# Patient Record
Sex: FEMALE | Race: WHITE | NOT HISPANIC OR LATINO | Employment: FULL TIME | URBAN - METROPOLITAN AREA
[De-identification: names, ages, dates, MRNs, and addresses within clinical notes are randomized per-mention and may not be internally consistent; named-entity substitution may affect disease eponyms.]

---

## 2021-07-05 ENCOUNTER — IMMUNIZATION (OUTPATIENT)
Dept: NURSING | Facility: CLINIC | Age: 36
End: 2021-07-05
Payer: COMMERCIAL

## 2021-07-05 PROCEDURE — 0001A PR COVID VAC PFIZER DIL RECON 30 MCG/0.3 ML IM: CPT

## 2021-07-05 PROCEDURE — 91300 PR COVID VAC PFIZER DIL RECON 30 MCG/0.3 ML IM: CPT

## 2021-07-26 ENCOUNTER — IMMUNIZATION (OUTPATIENT)
Dept: NURSING | Facility: CLINIC | Age: 36
End: 2021-07-26
Attending: INTERNAL MEDICINE
Payer: COMMERCIAL

## 2021-07-26 PROCEDURE — 91300 PR COVID VAC PFIZER DIL RECON 30 MCG/0.3 ML IM: CPT

## 2021-07-26 PROCEDURE — 0002A PR COVID VAC PFIZER DIL RECON 30 MCG/0.3 ML IM: CPT

## 2021-11-09 ENCOUNTER — OFFICE VISIT (OUTPATIENT)
Dept: INTERNAL MEDICINE | Facility: CLINIC | Age: 36
End: 2021-11-09
Payer: COMMERCIAL

## 2021-11-09 VITALS
HEART RATE: 90 BPM | OXYGEN SATURATION: 97 % | HEIGHT: 67 IN | SYSTOLIC BLOOD PRESSURE: 110 MMHG | BODY MASS INDEX: 40.34 KG/M2 | TEMPERATURE: 99 F | DIASTOLIC BLOOD PRESSURE: 76 MMHG | WEIGHT: 257 LBS

## 2021-11-09 DIAGNOSIS — B07.8 COMMON WART: ICD-10-CM

## 2021-11-09 DIAGNOSIS — E66.01 MORBID OBESITY (H): ICD-10-CM

## 2021-11-09 DIAGNOSIS — Z00.00 ROUTINE GENERAL MEDICAL EXAMINATION AT A HEALTH CARE FACILITY: Primary | ICD-10-CM

## 2021-11-09 DIAGNOSIS — Z13.220 ENCOUNTER FOR SCREENING FOR LIPID DISORDER: ICD-10-CM

## 2021-11-09 DIAGNOSIS — Z23 ENCOUNTER FOR IMMUNIZATION: ICD-10-CM

## 2021-11-09 DIAGNOSIS — G43.109 MIGRAINE WITH AURA AND WITHOUT STATUS MIGRAINOSUS, NOT INTRACTABLE: ICD-10-CM

## 2021-11-09 DIAGNOSIS — Z13.29 SCREENING FOR THYROID DISORDER: ICD-10-CM

## 2021-11-09 DIAGNOSIS — Z13.1 ENCOUNTER FOR SCREENING FOR DIABETES MELLITUS: ICD-10-CM

## 2021-11-09 LAB
ALBUMIN SERPL-MCNC: 3.6 G/DL (ref 3.4–5)
ALP SERPL-CCNC: 67 U/L (ref 40–150)
ALT SERPL W P-5'-P-CCNC: 26 U/L (ref 0–50)
ANION GAP SERPL CALCULATED.3IONS-SCNC: 9 MMOL/L (ref 3–14)
AST SERPL W P-5'-P-CCNC: 17 U/L (ref 0–45)
BILIRUB SERPL-MCNC: 0.2 MG/DL (ref 0.2–1.3)
BUN SERPL-MCNC: 18 MG/DL (ref 7–30)
CALCIUM SERPL-MCNC: 8.9 MG/DL (ref 8.5–10.1)
CHLORIDE BLD-SCNC: 105 MMOL/L (ref 94–109)
CHOLEST SERPL-MCNC: 202 MG/DL
CO2 SERPL-SCNC: 21 MMOL/L (ref 20–32)
CREAT SERPL-MCNC: 0.92 MG/DL (ref 0.52–1.04)
FASTING STATUS PATIENT QL REPORTED: NO
GFR SERPL CREATININE-BSD FRML MDRD: 81 ML/MIN/1.73M2
GLUCOSE BLD-MCNC: 104 MG/DL (ref 70–99)
HDLC SERPL-MCNC: 56 MG/DL
LDLC SERPL CALC-MCNC: 116 MG/DL
NONHDLC SERPL-MCNC: 146 MG/DL
POTASSIUM BLD-SCNC: 4 MMOL/L (ref 3.4–5.3)
PROT SERPL-MCNC: 7 G/DL (ref 6.8–8.8)
SODIUM SERPL-SCNC: 135 MMOL/L (ref 133–144)
TRIGL SERPL-MCNC: 152 MG/DL
TSH SERPL DL<=0.005 MIU/L-ACNC: 1.94 MU/L (ref 0.4–4)

## 2021-11-09 PROCEDURE — 80053 COMPREHEN METABOLIC PANEL: CPT | Performed by: INTERNAL MEDICINE

## 2021-11-09 PROCEDURE — 90715 TDAP VACCINE 7 YRS/> IM: CPT | Performed by: INTERNAL MEDICINE

## 2021-11-09 PROCEDURE — 90471 IMMUNIZATION ADMIN: CPT | Performed by: INTERNAL MEDICINE

## 2021-11-09 PROCEDURE — 99213 OFFICE O/P EST LOW 20 MIN: CPT | Mod: 25 | Performed by: INTERNAL MEDICINE

## 2021-11-09 PROCEDURE — 99385 PREV VISIT NEW AGE 18-39: CPT | Mod: 25 | Performed by: INTERNAL MEDICINE

## 2021-11-09 PROCEDURE — 36415 COLL VENOUS BLD VENIPUNCTURE: CPT | Performed by: INTERNAL MEDICINE

## 2021-11-09 PROCEDURE — 84443 ASSAY THYROID STIM HORMONE: CPT | Performed by: INTERNAL MEDICINE

## 2021-11-09 PROCEDURE — 80061 LIPID PANEL: CPT | Performed by: INTERNAL MEDICINE

## 2021-11-09 RX ORDER — SUMATRIPTAN 50 MG/1
50 TABLET, FILM COATED ORAL
Qty: 30 TABLET | Refills: 1 | Status: SHIPPED | OUTPATIENT
Start: 2021-11-09 | End: 2023-02-28

## 2021-11-09 SDOH — ECONOMIC STABILITY: INCOME INSECURITY: HOW HARD IS IT FOR YOU TO PAY FOR THE VERY BASICS LIKE FOOD, HOUSING, MEDICAL CARE, AND HEATING?: NOT HARD AT ALL

## 2021-11-09 SDOH — ECONOMIC STABILITY: INCOME INSECURITY: IN THE LAST 12 MONTHS, WAS THERE A TIME WHEN YOU WERE NOT ABLE TO PAY THE MORTGAGE OR RENT ON TIME?: NO

## 2021-11-09 SDOH — HEALTH STABILITY: PHYSICAL HEALTH: ON AVERAGE, HOW MANY DAYS PER WEEK DO YOU ENGAGE IN MODERATE TO STRENUOUS EXERCISE (LIKE A BRISK WALK)?: 3 DAYS

## 2021-11-09 SDOH — ECONOMIC STABILITY: FOOD INSECURITY: WITHIN THE PAST 12 MONTHS, YOU WORRIED THAT YOUR FOOD WOULD RUN OUT BEFORE YOU GOT MONEY TO BUY MORE.: NEVER TRUE

## 2021-11-09 SDOH — ECONOMIC STABILITY: TRANSPORTATION INSECURITY
IN THE PAST 12 MONTHS, HAS LACK OF TRANSPORTATION KEPT YOU FROM MEETINGS, WORK, OR FROM GETTING THINGS NEEDED FOR DAILY LIVING?: NO

## 2021-11-09 SDOH — HEALTH STABILITY: PHYSICAL HEALTH: ON AVERAGE, HOW MANY MINUTES DO YOU ENGAGE IN EXERCISE AT THIS LEVEL?: 30 MIN

## 2021-11-09 SDOH — ECONOMIC STABILITY: TRANSPORTATION INSECURITY
IN THE PAST 12 MONTHS, HAS THE LACK OF TRANSPORTATION KEPT YOU FROM MEDICAL APPOINTMENTS OR FROM GETTING MEDICATIONS?: NO

## 2021-11-09 SDOH — ECONOMIC STABILITY: FOOD INSECURITY: WITHIN THE PAST 12 MONTHS, THE FOOD YOU BOUGHT JUST DIDN'T LAST AND YOU DIDN'T HAVE MONEY TO GET MORE.: NEVER TRUE

## 2021-11-09 ASSESSMENT — ENCOUNTER SYMPTOMS
EYE PAIN: 0
DYSURIA: 0
DIZZINESS: 0
SORE THROAT: 0
NERVOUS/ANXIOUS: 0
JOINT SWELLING: 0
HEMATOCHEZIA: 0
COUGH: 0
DIARRHEA: 1
CHILLS: 0
HEADACHES: 1
BREAST MASS: 0
PALPITATIONS: 0
PARESTHESIAS: 0
NAUSEA: 0
HEMATURIA: 0
WEAKNESS: 0
SHORTNESS OF BREATH: 0
ARTHRALGIAS: 0
CONSTIPATION: 0
MYALGIAS: 0
ABDOMINAL PAIN: 0
FREQUENCY: 1
HEARTBURN: 0
FEVER: 0

## 2021-11-09 ASSESSMENT — SOCIAL DETERMINANTS OF HEALTH (SDOH)
HOW OFTEN DO YOU GET TOGETHER WITH FRIENDS OR RELATIVES?: ONCE A WEEK
HOW OFTEN DO YOU ATTEND CHURCH OR RELIGIOUS SERVICES?: 1 TO 4 TIMES PER YEAR
IN A TYPICAL WEEK, HOW MANY TIMES DO YOU TALK ON THE PHONE WITH FAMILY, FRIENDS, OR NEIGHBORS?: ONCE A WEEK
DO YOU BELONG TO ANY CLUBS OR ORGANIZATIONS SUCH AS CHURCH GROUPS UNIONS, FRATERNAL OR ATHLETIC GROUPS, OR SCHOOL GROUPS?: NO

## 2021-11-09 ASSESSMENT — LIFESTYLE VARIABLES
HOW MANY STANDARD DRINKS CONTAINING ALCOHOL DO YOU HAVE ON A TYPICAL DAY: 1 OR 2
HOW OFTEN DO YOU HAVE A DRINK CONTAINING ALCOHOL: 2-4 TIMES A MONTH
HOW OFTEN DO YOU HAVE SIX OR MORE DRINKS ON ONE OCCASION: NEVER

## 2021-11-09 ASSESSMENT — MIFFLIN-ST. JEOR: SCORE: 1890.43

## 2021-11-09 NOTE — LETTER
November 10, 2021      Cassie Mcfarland  [address]   [facility] [address]       Dear Ms. Cassie Mcfarland:    I am writing to inform you of the results of the laboratory tests you had done recently. Your comprehensive metabolic panel (which looks at your liver, kidneys, electrolytes, and glucose) is normal. Your cholesterol panel is mildly elevated, but not to a level where I would recommend starting a medication to lower it at this point given your age and otherwise relative good health. Your TSH is normal.    Thank you for allowing me to participate in your care. If you have any further questions or problems, please contact me via our nurse line at [phone]. An even easier way to get ahold of myself or my team is through [employer], which you can sign up for at [email or web address]. MyChart is not only a great way to communicate with us, but also allows you to see your full results.      Sincerely,        Kenneth Chaves MD, MPH  Department of Internal Medicine  [facility]

## 2021-11-09 NOTE — LETTER
[facility]  [address] [address]  Phone: [phone]   11/10/2021      Cassie SHAWNA Bruna  [address]   [facility] [address]        Dear Ms. Cassie Mcfarland:    I am writing to inform you of the results of the laboratory tests you had done recently. Your comprehensive metabolic panel (which looks at your liver, kidneys, electrolytes, and glucose) is normal. Your cholesterol panel is mildly elevated, but not to a level where I would recommend starting a medication to lower it at this point given your age and otherwise relative good health. Your TSH is normal.    Thank you for allowing me to participate in your care. If you have any further questions or problems, please contact me via our nurse line at [phone]. An even easier way to get ahold of myself or my team is through [employer], which you can sign up for at [email or web address]. [employer] is not only a great way to communicate with us, but also allows you to see your full results.      Sincerely,        Kenneth Chaves MD, MPH  Department of Internal Medicine  [facility]

## 2021-11-09 NOTE — PROGRESS NOTES
SUBJECTIVE:   CC: Cassie Mcfarland is an 36 year old woman who presents for preventive health visit.     Patient has been advised of split billing requirements and indicates understanding: Yes     Healthy Habits:     Getting at least 3 servings of Calcium per day:  Yes    Bi-annual eye exam:  NO    Dental care twice a year:  Yes    Sleep apnea or symptoms of sleep apnea:  None    Diet:  Regular (no restrictions)    Frequency of exercise:  2-3 days/week    Duration of exercise:  30-45 minutes    Taking medications regularly:  Yes    Medication side effects:  None    PHQ-2 Total Score: 1    Additional concerns today:  Yes    Cassie presents today for a physical exam. This is the first time I have met Cassie. We also discussed her migraines. She gets these infrequently (less than once a month). Does have an aura.  She tells me that her family member gave her a dose of sumatriptan for a prior migraine attack which worked and she is interested in trying this medication for herself.  She would like me to look inside of her ears to see if there is any wax.  She has a wart in between her third and fourth fingers on her right she would like me to look at it.  She tells me she has been working with a dietitian at her gym but has been unable to lose weight.  She presents with a list of roughly 8 thyroid-related lab tests that her dietitian has requested I order.    Today's PHQ-2 Score:   PHQ-2 ( 1999 [employer]) 11/9/2021   Q1: Little interest or pleasure in doing things 0   Q2: Feeling down, depressed or hopeless 1   PHQ-2 Score 1   Q1: Little interest or pleasure in doing things Not at all   Q2: Feeling down, depressed or hopeless Several days   PHQ-2 Score 1     Abuse: Current or Past (Physical, Sexual or Emotional) - No  Do you feel safe in your environment? Yes    Social History     Tobacco Use     Smoking status: Never Smoker     Smokeless tobacco: Never Used   Substance Use Topics     Alcohol use: Yes     Alcohol/week: 2.0  standard drinks     Types: 2 Standard drinks or equivalent per week     If you drink alcohol do you typically have >3 drinks per day or >7 drinks per week? No    Alcohol Use 11/9/2021   Prescreen: >3 drinks/day or >7 drinks/week? No   Prescreen: >3 drinks/day or >7 drinks/week? -     Reviewed orders with patient.  Reviewed health maintenance and updated orders accordingly - Yes    Labs reviewed in EPIC    Breast Cancer Screening:  FHS-7:   Breast CA Risk Assessment (FHS-7) 11/9/2021   Did any of your first-degree relatives have breast or ovarian cancer? Yes   Did any of your relatives have bilateral breast cancer? Unknown   Did any man in your family have breast cancer? No   Did any woman in your family have breast and ovarian cancer? Yes   Did any woman in your family have breast cancer before age 50 y? No   Do you have 2 or more relatives with breast and/or ovarian cancer? Yes   Do you have 2 or more relatives with breast and/or bowel cancer? Yes     Patient under 40 years of age: Routine Mammogram Screening not recommended.   Pertinent mammograms are reviewed under the imaging tab.    History of abnormal Pap smear: NO - age 30-65 PAP every 5 years with negative HPV co-testing recommended     Reviewed and updated as needed this visit by clinical staff  Tobacco  Allergies  Meds  Problems  Med Hx  Surg Hx  Fam Hx  Soc Hx     Reviewed and updated as needed this visit by Provider  Tobacco  Allergies  Meds  Problems  Med Hx  Surg Hx  Fam Hx        Review of Systems   Constitutional: Negative for chills and fever.   HENT: Negative for congestion, ear pain, hearing loss and sore throat.    Eyes: Negative for pain and visual disturbance.   Respiratory: Negative for cough and shortness of breath.    Cardiovascular: Negative for chest pain, palpitations and peripheral edema.   Gastrointestinal: Positive for diarrhea. Negative for abdominal pain, constipation, heartburn, hematochezia and nausea.   Breasts:   "Negative for tenderness, breast mass and discharge.   Genitourinary: Positive for frequency. Negative for dysuria, genital sores, hematuria, pelvic pain, urgency, vaginal bleeding and vaginal discharge.   Musculoskeletal: Negative for arthralgias, joint swelling and myalgias.   Skin: Negative for rash.   Neurological: Positive for headaches. Negative for dizziness, weakness and paresthesias.   Psychiatric/Behavioral: Negative for mood changes. The patient is not nervous/anxious.       OBJECTIVE:   Pulse 90   Temp 99  F (37.2  C) (Tympanic)   Ht 1.689 m (5' 6.5\")   Wt 116.6 kg (257 lb)   LMP  (Exact Date)   SpO2 97%   BMI 40.86 kg/m       Physical Exam  GENERAL: In no distress.  EYES: Conjunctivae/corneas clear. EOMs grossly intact. PERRL.  HENT: NC/AT, facies symmetric. Neck supple. No LAD or thyromegaly noted.  RESP: CTAB. No w/r/r.  CV: RRR, no m/r/g.  GI: NT, ND, without rebound or guarding, no CVA tenderness, no hepatomegaly appreciated.  MSK: Moves all four extremities freely.  SKIN: No significant ulcers, lesions or rashes on the visualized portions of the skin  NEURO: Alert. Oriented.  PSYCH: Linear thought process. Speech normal rate and volume. No tangential thoughts, hallucinations, or delusions.    Diagnostic Test Results: Labs reviewed in [facility]    ASSESSMENT/PLAN:   Routine general medical examination at a health care facility  Reviewed PMH. Discussed healthcare maintenance issues, including cancer screenings (discussed her strong FH of breast cancer, offered genetic counselor referral which she declined today but plans to think about it. Also offered cervical cancer screening today but she declined a Pap smear today), relevant immunizations, and cardiac risk factor screenings such as for cholesterol, HTN, and DM.    Migraine with aura and without status migrainosus, not intractable  Infrequent. Have not responded to NSAIDs. Will trial sumatriptan as that has worked for her in the past as per HPI. " "Discussed appropriate dosing.  - SUMAtriptan (IMITREX) 50 MG tablet; Take 1 tablet (50 mg) by mouth at onset of headache for migraine May repeat in 2 hours. Max 4 tablets/24 hours.    Screening for thyroid disorder  I explained that the initial screening testing for a thyroid disorder is a TSH w/ reflex to free T4. If this is normal then no further thyroid testing is required. I declined to order the other tests requested by her dietician (who she tells me is hoping to work with her on supplements to help with her weight loss). I encouraged her to be wary of practitioners who try to sell her unregulated or poorly-regulated weight loss supplements.  - TSH with free T4 reflex    Common wart  Discussed freezing wart vs salicylic acid therapy and she preferred the second route.  - Salicylic Acid 40 % PADS; Externally apply 1 each topically daily Soak wart in warm water for 5 minutes. Dry area thoroughly. Apply medicated pad directly over wart and secure firmly to skin. Repeat every 48 hours as needed until wart is removed    Encounter for screening for lipid disorder  - Lipid panel reflex to direct LDL Non-fasting    Encounter for screening for diabetes mellitus  - Comprehensive metabolic panel    Morbid obesity (H)  Known issue that I take into account for their medical decisions, no current exacerbations or new concerns. BMI 40+. Tells me she is working with a dietician at her gym.    Encounter for immunization  - TDAP VACCINE (Adacel, Boostrix)  [[id number]]    Patient has been advised of split billing requirements and indicates understanding: Yes     COUNSELING:  Special attention given to:        Regular exercise       Healthy diet/nutrition       Immunizations    Vaccinated for: TDAP         Genetic cancer risk screening    Estimated body mass index is 40.86 kg/m  as calculated from the following:    Height as of this encounter: 1.689 m (5' 6.5\").    Weight as of this encounter: 116.6 kg (257 lb).    She reports that " she has never smoked. She has never used smokeless tobacco.      Kenneth Chaves MD  [facility]

## 2021-11-09 NOTE — PATIENT INSTRUCTIONS
- Our team will contact you via [employer] (if you sign up for it), telephone call (if results are urgent), or otherwise via letter in the mail with the results of today's lab tests once I have a chance to review them    - Sumatriptan dosing recommendations:  Initial: 50 to 100 mg once. If initial dose was partially effective or headache recurs, may repeat a dose (usually same as first dose) after ?2 hours. Maximum dose: 100 mg per dose; 200 mg per 24 hours.

## 2021-12-05 ENCOUNTER — HEALTH MAINTENANCE LETTER (OUTPATIENT)
Age: 36
End: 2021-12-05

## 2022-05-12 ENCOUNTER — LAB (OUTPATIENT)
Dept: URGENT CARE | Facility: URGENT CARE | Age: 37
End: 2022-05-12
Attending: FAMILY MEDICINE
Payer: COMMERCIAL

## 2022-05-12 DIAGNOSIS — Z20.822 SUSPECTED 2019 NOVEL CORONAVIRUS INFECTION: ICD-10-CM

## 2022-05-12 LAB — SARS-COV-2 RNA RESP QL NAA+PROBE: NEGATIVE

## 2022-05-12 PROCEDURE — U0005 INFEC AGEN DETEC AMPLI PROBE: HCPCS

## 2022-05-12 PROCEDURE — U0003 INFECTIOUS AGENT DETECTION BY NUCLEIC ACID (DNA OR RNA); SEVERE ACUTE RESPIRATORY SYNDROME CORONAVIRUS 2 (SARS-COV-2) (CORONAVIRUS DISEASE [COVID-19]), AMPLIFIED PROBE TECHNIQUE, MAKING USE OF HIGH THROUGHPUT TECHNOLOGIES AS DESCRIBED BY CMS-2020-01-R: HCPCS

## 2022-08-10 ENCOUNTER — OFFICE VISIT (OUTPATIENT)
Dept: URGENT CARE | Facility: URGENT CARE | Age: 37
End: 2022-08-10
Payer: COMMERCIAL

## 2022-08-10 VITALS
TEMPERATURE: 98.3 F | SYSTOLIC BLOOD PRESSURE: 125 MMHG | OXYGEN SATURATION: 97 % | DIASTOLIC BLOOD PRESSURE: 86 MMHG | HEART RATE: 71 BPM

## 2022-08-10 DIAGNOSIS — H66.002 NON-RECURRENT ACUTE SUPPURATIVE OTITIS MEDIA OF LEFT EAR WITHOUT SPONTANEOUS RUPTURE OF TYMPANIC MEMBRANE: Primary | ICD-10-CM

## 2022-08-10 PROCEDURE — 99213 OFFICE O/P EST LOW 20 MIN: CPT | Performed by: INTERNAL MEDICINE

## 2022-08-10 RX ORDER — AMOXICILLIN 875 MG
875 TABLET ORAL 2 TIMES DAILY
Qty: 20 TABLET | Refills: 0 | Status: SHIPPED | OUTPATIENT
Start: 2022-08-10 | End: 2022-08-20

## 2022-08-10 NOTE — PATIENT INSTRUCTIONS
In addition to the antibiotic, use ibuprofen 600 mg three times daily with food for the next couple of days.  Once the pressure in your ears is subsiding you can cut back on the ibuprofen.

## 2022-08-10 NOTE — PROGRESS NOTES
Assessment & Plan     Non-recurrent acute suppurative otitis media of left ear without spontaneous rupture of tympanic membrane  - amoxicillin (AMOXIL) 875 MG tablet; Take 1 tablet (875 mg) by mouth 2 times daily for 10 days    Huber Khan MD  [facility] TAVIA Matthews is a 37 year old accompanied by her spouse, presenting for the following health issues:  Urgent Care (Cough, both ears plugged,congestion and dizziness which started a week ago. Pt took two Covid test 8/5/ and 8/8 and it was negative.)      HPI   Concern with respiratory symptoms. Having bilateral ear pain and pressure with some dizziness.  Cough, throat-clearing.  Had some sore throat but this is better.  COVID negative at home x 2.  Symptoms have been present for about 7 days.  Ears are increasing in the past couple of days.     Review of Systems   Constitutional, HEENT, cardiovascular, pulmonary, gi and gu systems are negative, except as otherwise noted.      Objective    /86 (BP Location: Right arm, Patient Position: Sitting, Cuff Size: Adult Regular)   Pulse 71   Temp 98.3  F (36.8  C) (Tympanic)   SpO2 97%   There is no height or weight on file to calculate BMI.  Physical Exam   GENERAL APPEARANCE: healthy, alert and no distress  HENT: the right tympanic membrane is thickened with preserved landmarks and circumferential erythema; the left tympanic membrane is opaque, angry and erythematous; cobblestoning of the posterior pharynx with post-nasal drainage   NECK: no adenopathy, no asymmetry, masses, or scars and thyroid normal to palpation  RESP: lungs clear to auscultation - no rales, rhonchi or wheezes  CV: regular rates and rhythm, normal S1 S2, no S3 or S4 and no murmur, click or rub                .  ..

## 2022-08-16 ENCOUNTER — OFFICE VISIT (OUTPATIENT)
Dept: FAMILY MEDICINE | Facility: CLINIC | Age: 37
End: 2022-08-16
Payer: COMMERCIAL

## 2022-08-16 ENCOUNTER — NURSE TRIAGE (OUTPATIENT)
Dept: INTERNAL MEDICINE | Facility: CLINIC | Age: 37
End: 2022-08-16

## 2022-08-16 VITALS
OXYGEN SATURATION: 100 % | SYSTOLIC BLOOD PRESSURE: 112 MMHG | BODY MASS INDEX: 39.27 KG/M2 | TEMPERATURE: 98 F | WEIGHT: 247 LBS | DIASTOLIC BLOOD PRESSURE: 81 MMHG | HEART RATE: 82 BPM

## 2022-08-16 DIAGNOSIS — H69.93 DYSFUNCTION OF BOTH EUSTACHIAN TUBES: Primary | ICD-10-CM

## 2022-08-16 PROCEDURE — 99213 OFFICE O/P EST LOW 20 MIN: CPT

## 2022-08-16 ASSESSMENT — ENCOUNTER SYMPTOMS: FEVER: 0

## 2022-08-16 NOTE — PATIENT INSTRUCTIONS
1) Start Flonase nasal spray on regular basis    2) Use Mucinex also to help.     3) Take ibuprofen only as needed for pain.     So nice to meet you!    Follow up for recheck after course of medication if not improved.     Ivet Thomas PA-C

## 2022-08-16 NOTE — PROGRESS NOTES
Assessment & Plan:        ICD-10-CM    1. Dysfunction of both eustachian tubes  H69.83          Plan/Clinical Decision Making:    Patient with OM dx on 8/10/22 and has completed about 5 day course of Amoxicillin.   Patient having persistent plugged ears. L >R.   Normal Right TM, Left hint of erythema on upper aspect of TM with nl bony landmarks and no bulging.   Continue Amoxicillin course.     Patient Instructions   1) Start Flonase nasal spray on regular basis    2) Use Mucinex also to help.     3) Take ibuprofen only as needed for pain.     So nice to meet you!    Follow up for recheck after course of medication if not improved.     Ivet Thomas PA-C    Follow-up sooner if pain or worsening symptoms.         Return if symptoms worsen or fail to improve 5 days.     At the end of the encounter, I discussed results, diagnosis, medications. Discussed red flags for immediate return to clinic/ER, as well as indications for follow up if no improvement. Patient understood and agreed to plan. Patient was stable for discharge.        Ivet Thomas PA-C on 8/16/2022 at 2:30 PM          Subjective:     HPI:    Cassie is a 37 year old female who presents to clinic today for the following health issues:  Chief Complaint   Patient presents with     Ear Problem     Patient is on amoxacillin for ear infection and both ears still plugged up      HPI    Patient complains of bilateral ears plugged x 1 week.   Seen at [facility] and dx with bilateral OM. Has taken medication since 8/10/22 and ears still feeling plugged.   Treated with course of Amoxicillin. No pain.  Taking ibuprofen regularly.   Symptoms started with cold and then developed OM, cold symptoms now mostly resolved.     History obtained from the patient.    Review of Systems   Constitutional: Negative for fever.   HENT: Positive for congestion (mild).          Patient Active Problem List   Diagnosis     Morbid obesity (H)        History reviewed. No pertinent  past medical history.    Social History     Tobacco Use     Smoking status: Never Smoker     Smokeless tobacco: Never Used   Substance Use Topics     Alcohol use: Yes     Alcohol/week: 2.0 standard drinks     Types: 2 Standard drinks or equivalent per week             Objective:     Vitals:    08/16/22 1404   BP: 112/81   BP Location: Right arm   Patient Position: Chair   Cuff Size: Adult Large   Pulse: 82   Temp: 98  F (36.7  C)   TempSrc: Tympanic   SpO2: 100%   Weight: 112 kg (247 lb)         Physical Exam   EXAM:   Pleasant, alert, appropriate appearance. NAD.  Head Exam: Normocephalic, atraumatic.  Eye Exam:  non icteric/injection.    Ear Exam: Right TM grey without bulging. Normal canals.  Normal pinna.Left TM slight erythema upper aspect of TM with nl bony landmarks, no bulging.   Nose Exam: Normal external nose.    OroPharynx Exam:  Moist mucous membranes. No erythema, pharynx without exudate or hypertrophy.  Neck/Thyroid Exam:  No LAD.  No nodules or enlargement.      Results:  No results found for any visits on 08/16/22.

## 2022-09-24 ENCOUNTER — HEALTH MAINTENANCE LETTER (OUTPATIENT)
Age: 37
End: 2022-09-24

## 2022-10-14 ENCOUNTER — OFFICE VISIT (OUTPATIENT)
Dept: FAMILY MEDICINE | Facility: CLINIC | Age: 37
End: 2022-10-14
Payer: COMMERCIAL

## 2022-10-14 VITALS
HEART RATE: 88 BPM | BODY MASS INDEX: 38.3 KG/M2 | DIASTOLIC BLOOD PRESSURE: 80 MMHG | WEIGHT: 244 LBS | HEIGHT: 67 IN | TEMPERATURE: 98.2 F | SYSTOLIC BLOOD PRESSURE: 116 MMHG | OXYGEN SATURATION: 98 % | RESPIRATION RATE: 20 BRPM

## 2022-10-14 DIAGNOSIS — H93.92 EAR PROBLEM, LEFT: Primary | ICD-10-CM

## 2022-10-14 PROCEDURE — 99213 OFFICE O/P EST LOW 20 MIN: CPT | Performed by: PHYSICIAN ASSISTANT

## 2022-10-14 NOTE — PATIENT INSTRUCTIONS
Try sudafed     Try flonase (over the counter)    Follow-up with ENT if symptoms don't improve in next 2-3 weeks: [facility] [phone] (check with your insurance before visit)

## 2022-10-14 NOTE — PROGRESS NOTES
"Assessment and Plan:     (H93.92) Ear problem, left  (primary encounter diagnosis)  Comment: intermittent tinnitus, plugged sensation since infection in August, exam is unremarkable, completed course of amox  Plan: flonase, sudafed, see ENT if does not improve in next 2-3 weeks      Julia Oakes PA-C        Anyiah Matthews is a 37 year old presenting for the following health issues:  No chief complaint on file.      ERWIN Matthews is here for an ear problem  She had an ear infection August and was treated with amoxicillin (she completed)  She feels like her right ear is back to normal, she continues to have ringing/plugged sensation in left ear  She notes occasional tenderness of the left ear  No drainage, fever/chills    Review of Systems   See above      Objective      /80 (BP Location: Right arm, Patient Position: Sitting, Cuff Size: Adult Large)   Pulse 88   Temp 98.2  F (36.8  C) (Temporal)   Resp 20   Ht 1.689 m (5' 6.5\")   Wt 110.7 kg (244 lb)   LMP 10/07/2022 (Approximate)   SpO2 98%   BMI 38.79 kg/m        Physical Exam     EXAM:  GENERAL APPEARANCE: healthy, alert and no distress  EYES: Eyes grossly normal to inspection, conjunctivae and sclerae normal  HENT: ear canals and TMs normal and nose and mouth without ulcers or lesions, oropharynx is clear without exudate or erythema, no tonsillar swelling  NECK: no adenopathy, no asymmetry, masses  RESP: lungs clear to auscultation - no rales, rhonchi or wheezes  CV: regular rates and rhythm, normal S1 S2, no S3 or S4 and no murmur, click or rub              "

## 2023-01-29 ENCOUNTER — HEALTH MAINTENANCE LETTER (OUTPATIENT)
Age: 38
End: 2023-01-29

## 2023-02-01 ENCOUNTER — TRANSFERRED RECORDS (OUTPATIENT)
Dept: HEALTH INFORMATION MANAGEMENT | Facility: CLINIC | Age: 38
End: 2023-02-01

## 2023-02-27 ASSESSMENT — ENCOUNTER SYMPTOMS
HEMATOCHEZIA: 0
FREQUENCY: 0
BREAST MASS: 0
ABDOMINAL PAIN: 0
DIZZINESS: 0
CONSTIPATION: 0
JOINT SWELLING: 0
MYALGIAS: 0
WEAKNESS: 0
HEARTBURN: 0
NERVOUS/ANXIOUS: 0
PALPITATIONS: 0
ARTHRALGIAS: 0
EYE PAIN: 0
PARESTHESIAS: 0
HEMATURIA: 0
NAUSEA: 0
SORE THROAT: 0
DYSURIA: 0
HEADACHES: 1
FEVER: 0
CHILLS: 0
SHORTNESS OF BREATH: 0
COUGH: 0
DIARRHEA: 0

## 2023-02-28 ENCOUNTER — OFFICE VISIT (OUTPATIENT)
Dept: INTERNAL MEDICINE | Facility: CLINIC | Age: 38
End: 2023-02-28
Payer: COMMERCIAL

## 2023-02-28 VITALS
WEIGHT: 241.6 LBS | TEMPERATURE: 98.9 F | OXYGEN SATURATION: 97 % | DIASTOLIC BLOOD PRESSURE: 82 MMHG | BODY MASS INDEX: 38.41 KG/M2 | HEART RATE: 96 BPM | SYSTOLIC BLOOD PRESSURE: 122 MMHG

## 2023-02-28 DIAGNOSIS — G43.109 MIGRAINE WITH AURA AND WITHOUT STATUS MIGRAINOSUS, NOT INTRACTABLE: ICD-10-CM

## 2023-02-28 DIAGNOSIS — G89.29 CHRONIC RIGHT SHOULDER PAIN: Primary | ICD-10-CM

## 2023-02-28 DIAGNOSIS — M25.511 CHRONIC RIGHT SHOULDER PAIN: Primary | ICD-10-CM

## 2023-02-28 PROCEDURE — 99214 OFFICE O/P EST MOD 30 MIN: CPT | Performed by: INTERNAL MEDICINE

## 2023-02-28 RX ORDER — CYCLOBENZAPRINE HCL 10 MG
5 TABLET ORAL 2 TIMES DAILY PRN
Qty: 20 TABLET | Refills: 0 | Status: SHIPPED | OUTPATIENT
Start: 2023-02-28 | End: 2023-06-01

## 2023-02-28 RX ORDER — SUMATRIPTAN 50 MG/1
50 TABLET, FILM COATED ORAL
Qty: 30 TABLET | Refills: 3 | Status: CANCELLED | OUTPATIENT
Start: 2023-02-28

## 2023-02-28 RX ORDER — RIZATRIPTAN BENZOATE 10 MG/1
10 TABLET ORAL
Qty: 30 TABLET | Refills: 1 | Status: SHIPPED | OUTPATIENT
Start: 2023-02-28 | End: 2024-03-07

## 2023-02-28 NOTE — PROGRESS NOTES
Assessment & Plan   Chronic right shoulder pain  Referral placed to her preferred external PT. Will trial cyclobenzaprine, cautioned re: possible sedation.  - Physical Therapy Referral; Future  - cyclobenzaprine (FLEXERIL) 10 MG tablet; Take 0.5 tablets (5 mg) by mouth 2 times daily as needed for muscle spasms    Migraine with aura and without status migrainosus, not intractable  Will switch from sumatriptan to rizatriptan to see if that has more of a therapeutic effect. Referral placed to headache clinic to discuss other options in case this does not help.  - rizatriptan (MAXALT) 10 MG tablet; Take 1 tablet (10 mg) by mouth at onset of headache for migraine May repeat in 2 hours. Max 3 tablets/24 hours.  - Adult Neurology  Referral; Future    F/u for physical in coming months    Kenneth Chaves MD  [facility] is a 37 year old who presented today for a physical as well as other concerns. Unfortunately, she arrived to clinic 15 minutes late to this visit today. She was offered a chance to reschedule or to convert this to a quick routine office visit to address her more acute concerns, she chose to move forward with a quick routine office visit today. She is hoping for a referral to PT for chronic R shoulder pain. Wondering about muscle relaxers. Sumatriptan has helped in the past for migraines but is no longer helping. Reports her last migraine was ~3 months ago.    Review of Systems   Constitutional, neuro, MSK systems are negative, except as otherwise noted.      Objective    /82   Pulse 96   Temp 98.9  F (37.2  C) (Temporal)   Wt 109.6 kg (241 lb 9.6 oz)   SpO2 97%   BMI 38.41 kg/m    Body mass index is 38.41 kg/m .     Physical Exam   GENERAL: alert and in no distress.  EYES: conjunctivae/corneas clear. EOMs grossly intact  HENT: Facies symmetric.  RESP: No iWOB.  MSK: Moves all four extremities freely  SKIN: No significant ulcers, lesions,  or rashes on the visualized portions of the skin  NEURO: CN II-XII grossly intact.

## 2023-05-20 NOTE — TELEPHONE ENCOUNTER
RECORDS RECEIVED FROM:    REASON FOR VISIT: Headaches    Date of Appt: 7/26/2023   NOTES (FOR ALL VISITS) STATUS DETAILS   OFFICE NOTE from referring provider Vviek Torres-2/28/2023   OFFICE NOTE from other specialist     DISCHARGE SUMMARY from hospital     DISCHARGE REPORT from the ER     OPERATIVE REPORT     [facility] (MS ONLY)     EMG     EEG     MEDICATION LIST     IMAGING  (FOR ALL VISITS)     LUMBAR PUNCTURE     ANABEL SCAN (MOVEMENT)     ULTRASOUND (CAROTID BILAT) *VASCULAR*     MRI (HEAD, NECK, SPINE)     CT (HEAD, NECK, SPINE)

## 2023-05-21 ENCOUNTER — NURSE TRIAGE (OUTPATIENT)
Dept: NURSING | Facility: CLINIC | Age: 38
End: 2023-05-21
Payer: COMMERCIAL

## 2023-05-21 ENCOUNTER — OFFICE VISIT (OUTPATIENT)
Dept: URGENT CARE | Facility: URGENT CARE | Age: 38
End: 2023-05-21
Payer: COMMERCIAL

## 2023-05-21 ENCOUNTER — ANCILLARY PROCEDURE (OUTPATIENT)
Dept: GENERAL RADIOLOGY | Facility: CLINIC | Age: 38
End: 2023-05-21
Attending: PHYSICIAN ASSISTANT
Payer: COMMERCIAL

## 2023-05-21 VITALS
RESPIRATION RATE: 20 BRPM | OXYGEN SATURATION: 97 % | BODY MASS INDEX: 39.14 KG/M2 | HEART RATE: 80 BPM | TEMPERATURE: 97.5 F | DIASTOLIC BLOOD PRESSURE: 87 MMHG | WEIGHT: 246.2 LBS | SYSTOLIC BLOOD PRESSURE: 135 MMHG

## 2023-05-21 DIAGNOSIS — S93.401A SPRAIN OF RIGHT ANKLE, UNSPECIFIED LIGAMENT, INITIAL ENCOUNTER: Primary | ICD-10-CM

## 2023-05-21 PROCEDURE — 99213 OFFICE O/P EST LOW 20 MIN: CPT | Performed by: PHYSICIAN ASSISTANT

## 2023-05-21 PROCEDURE — 73610 X-RAY EXAM OF ANKLE: CPT | Mod: TC | Performed by: RADIOLOGY

## 2023-05-21 NOTE — LETTER
May 21, 2023      Cassie MATOS Mcfarland  [address]  [facility] [address]        To Whom It May Concern:    Cassie Mcfarland was seen in our clinic. She may return to work, sitting as needed x 2 weeks or until podiatry evaluation.        Sincerely,        Cassandra Simon

## 2023-05-21 NOTE — PROGRESS NOTES
SUBJECTIVE:   Cassie Mcfarland is a 38 year old female presenting with a chief complaint of   Chief Complaint   Patient presents with     Ankle Pain     Right ankle pain        She is an established patient of [facility].  Patient presents with right ankle pain after rolling the ankle yesterday.  Per patient, has had bad sprain previously that required PT in the past.      Treatment:  Ice, tylenol and motrin.          Review of Systems    History reviewed. No pertinent past medical history.  History reviewed. No pertinent family history.  Current Outpatient Medications   Medication Sig Dispense Refill     cyclobenzaprine (FLEXERIL) 10 MG tablet Take 0.5 tablets (5 mg) by mouth 2 times daily as needed for muscle spasms 20 tablet 0     rizatriptan (MAXALT) 10 MG tablet Take 1 tablet (10 mg) by mouth at onset of headache for migraine May repeat in 2 hours. Max 3 tablets/24 hours. 30 tablet 1     Social History     Tobacco Use     Smoking status: Never     Smokeless tobacco: Never   Vaping Use     Vaping status: Not on file   Substance Use Topics     Alcohol use: Yes     Alcohol/week: 2.0 standard drinks of alcohol     Types: 2 Standard drinks or equivalent per week       OBJECTIVE  /87 (BP Location: Right arm, Patient Position: Sitting, Cuff Size: Adult Large)   Pulse 80   Temp 97.5  F (36.4  C) (Oral)   Resp 20   Wt 111.7 kg (246 lb 3.2 oz)   SpO2 97%   BMI 39.14 kg/m      Physical Exam  Vitals and nursing note reviewed.   Constitutional:       Appearance: Normal appearance. She is obese.   Eyes:      Extraocular Movements: Extraocular movements intact.      Conjunctiva/sclera: Conjunctivae normal.   Cardiovascular:      Rate and Rhythm: Normal rate.   Musculoskeletal:      Comments: Right ankle:  Edema to lateral malleolus.  Tenderness to palpation of the same.  DP present.  No 5th tuberosity pain.  Decreased ROM due to pain   Neurological:      Mental Status: She is alert.         Labs:  Results for orders  placed or performed in visit on 05/21/23 (from the past 24 hour(s))   XR Ankle Right G/E 3 Views    Narrative    EXAM: XR ANKLE RIGHT G/E 3 VIEWS  LOCATION: [facility]  DATE/TIME: 5/21/2023 1:59 PM CDT    INDICATION: Right ankle pain after injury.  COMPARISON: None.      Impression    IMPRESSION: Moderate lateral ankle soft tissue swelling. Negative for fracture. Normal joint alignment. Normal joint spacing with no significant arthritic changes. Minimal heterotopic ossification along the anterior inferior aspect of the medial   malleolus.       X-Ray was done, my findings are:     ASSESSMENT:      ICD-10-CM    1. Sprain of right ankle, unspecified ligament, initial encounter  S93.401A XR Ankle Right G/E 3 Views     Ankle/Foot Bracing Supplies DME Walking Boot; Right; Pneumatic     Orthopedic  Referral     Crutches Order for DME - ONLY FOR DME           Medical Decision Making:    Differential Diagnosis:  MS Injury Pain: sprain and fracture    Serious Comorbid Conditions:  Adult:  reviewed    PLAN:    Note for work.  Tylenol/motrin prn.  Patient placed in cam walker.  Weight bearing as tolerated.  Ortho referral.  RICE.  Patient requesting crutches.        Followup:    If not improving or if condition worsens, follow up with your Primary Care Provider, If not improving or if conditions worsens over the next 12-24 hours, go to the Emergency Department    There are no Patient Instructions on file for this visit.

## 2023-05-21 NOTE — TELEPHONE ENCOUNTER
"Nurse Triage SBAR    Is this a 2nd Level Triage? NO    Situation:     Patient calling reporting \"I think I sprained my ankle.\"    Background:     History of right sprained ankle 13 years ago per patient.    Assessment:     Patient reporting stepping on uneven ground yesterday spraining right ankle.  Reporting ankle foot \"double in size.\" Minimal weight bearing.   Denies numbness or tingling.  Limited range of motion.  Pain improves at rest.  Patient is icing has taken 1 dose of acetaminophen last evening.    Protocol Recommended Disposition:   See provider with in 24 hours.    Reason for Disposition    [1] Limp when walking AND [2] due to a twisted ankle or foot    Additional Information    Negative: Serious injury with multiple fractures (broken bones)    Negative: [1] Major bleeding (e.g., actively dripping or spurting) AND [2] can't be stopped    Negative: Amputation    Negative: Looks like a dislocated joint (very crooked or deformed)    Negative: Sounds like a life-threatening emergency to the triager    Negative: Bullet wound, stabbed by knife, or other serious penetrating wound    Negative: Skin is split open or gaping (or length > 1/2 inch or 12 mm)    Negative: [1] Bleeding AND [2] won't stop after 10 minutes of direct pressure (using correct technique)    Negative: [1] Dirt in the wound AND [2] not removed with 15 minutes of scrubbing    Negative: Can't stand (bear weight) or walk    Negative: [1] Numbness (new loss of sensation) of toe(s) AND [2] present now    Negative: Sounds like a serious injury to the triager    Negative: [1] SEVERE pain AND [2] not improved 2 hours after pain medicine/ice packs    Negative: Suspicious history for the injury    Protocols used: ANKLE AND FOOT INJURY-A-AH      "

## 2023-05-23 ENCOUNTER — OFFICE VISIT (OUTPATIENT)
Dept: PODIATRY | Facility: CLINIC | Age: 38
End: 2023-05-23
Attending: PHYSICIAN ASSISTANT
Payer: COMMERCIAL

## 2023-05-23 VITALS
BODY MASS INDEX: 39.53 KG/M2 | SYSTOLIC BLOOD PRESSURE: 128 MMHG | WEIGHT: 246 LBS | HEIGHT: 66 IN | DIASTOLIC BLOOD PRESSURE: 78 MMHG

## 2023-05-23 DIAGNOSIS — S93.401A SPRAIN OF RIGHT ANKLE, UNSPECIFIED LIGAMENT, INITIAL ENCOUNTER: ICD-10-CM

## 2023-05-23 DIAGNOSIS — S93.491A SPRAIN OF ANTERIOR TALOFIBULAR LIGAMENT OF RIGHT ANKLE, INITIAL ENCOUNTER: Primary | ICD-10-CM

## 2023-05-23 PROCEDURE — 99203 OFFICE O/P NEW LOW 30 MIN: CPT | Performed by: PODIATRIST

## 2023-05-23 NOTE — PROGRESS NOTES
ASSESSMENT:  Encounter Diagnoses   Name Primary?     Sprain of anterior talofibular ligament of right ankle, initial encounter Yes     Sprain of right ankle, unspecified ligament, initial encounter      MEDICAL DECISION MAKING:  I personally reviewed the x-ray images with Cassie and her .  The ankle mortise is congruent and intact.  No fractures seen.  Small osseous densities seen near the medial lateral gutter, likely from old injury.  Specifically no fracture involving the ankle, anterior process of the calcaneus or base of the fifth metatarsal.    PRICE therapy was reviewed  She will continue crutches based on pain level.  Aircast immobilization  She is instructed to remove the device for ankle range of motion exercises to prevent DVT and deconditioning.    Follow-up in 2 to 3 weeks for reevaluation.  At that time she might be interested in a Tri-Lock ankle brace and physical therapy.  Physical therapy was helpful after her last sprain.    Disclaimer: This note consists of symbols derived from keyboarding, dictation and/or voice recognition software. As a result, there may be errors in the script that have gone undetected. Please consider this when interpreting information found in this chart.    Crescencio Bajwa DPM, FACFAS, MS    [facility] Department of Podiatry/Foot & Ankle Surgery      ____________________________________________________________________    HPI:       Follow-up from 8 5/21/2023 urgent care visit  Chief Complaint: Right ankle sprain  Onset of problem: Cassie reports twisting her ankle yard  Due to fairly significant pain, she is wearing an Aircast and using crutches at this time.  X-ray done in urgent care was negative for fracture.  She reports a history of a right ankle sprain that was more severe.  This was a trampoline injury.      *No past medical history on file.*  *No past surgical history on file.*  *  Current Outpatient Medications   Medication Sig Dispense Refill     cyclobenzaprine  "(FLEXERIL) 10 MG tablet Take 0.5 tablets (5 mg) by mouth 2 times daily as needed for muscle spasms 20 tablet 0     rizatriptan (MAXALT) 10 MG tablet Take 1 tablet (10 mg) by mouth at onset of headache for migraine May repeat in 2 hours. Max 3 tablets/24 hours. 30 tablet 1         EXAM:    Vitals: /78   Ht 1.676 m (5' 6\")   Wt 111.6 kg (246 lb)   BMI 39.71 kg/m    BMI: Body mass index is 39.71 kg/m .    Constitutional:  Cassie Mcfarland is in no apparent distress, appears well-nourished.  Cooperative with history and physical exam.    Vascular:  Pedal pulses are palpable for both the DP and PT arteries.  CFT < 3 sec. Mild lateral right ankle edema.     Neuro: Light touch sensation is intact to the L4, L5, S1 distributions  No evidence of weakness, spasticity, or contracture in the lower extremities.     Derm: There is some ecchymosis on the lateral aspect of the right calcaneus.  No blisters.  No other skin injury.    Musculoskeletal:    Lower extremity muscle strength is normal. No gross deformities.  Pain on palpation directly over the right anterior talofibular ligament.  Mild pain over the calcaneofibular ligament and posterior talofibular ligament.  Palpatory exam otherwise unremarkable.  No pain with stressing the right ankle syndesmosis.    EXAM: XR ANKLE RIGHT G/E 3 VIEWS  LOCATION: [facility]  DATE/TIME: 5/21/2023 1:59 PM CDT     INDICATION: Right ankle pain after injury.  COMPARISON: None.                                                                      IMPRESSION: Moderate lateral ankle soft tissue swelling. Negative for fracture. Normal joint alignment. Normal joint spacing with no significant arthritic changes. Minimal heterotopic ossification along the anterior inferior aspect of the medial   malleolus.      "

## 2023-05-23 NOTE — PATIENT INSTRUCTIONS
Thank you for choosing [facility] Podiatry / Foot & Ankle Surgery!    DR. WEAVER'S CLINIC LOCATIONS:     [facility] TRIAGE LINE: [phone]   [address] [place] [address] APPOINTMENTS: [phone]   [place] [address] RADIOLOGY: [phone]   (Every other Tues - Wed - Fri PM) SET UP SURGERY: [phone]    PHYSICAL THERAPY: [phone]   [place] SPECIALTY BILLING QUESTIONS: [phone] [address] FAX: [phone]   [place] [address]    (Thurs & Fri AM)       PRICE THERAPY  Many aches and pains throughout the foot and ankle can be helped with many simple treatments. This is usually described as PRICE Therapy.      P - Protection - often times, inflammation/pain in the lower extremity is not able to improve simply because the areas involved are never allowed to rest. Every step we take can bother the problematic area. Protecting those areas is an important step in the healing process. This may involve a walking cast boot, a special insert/orthotic device, an ankle brace, or simply avoiding barefoot walking.    R - Rest - in addition to protecting the foot/ankle, resting is an important, but often times difficult, treatment option. Getting off your feet when they bother you, and specifically avoiding activities that cause pain/discomfort, are very beneficial to prevent, and treat, foot/ankle pain.      I - Ice - icing regularly can help to decrease inflammation and swelling in the foot, thus decreasing pain. Using an ice pack or a bag of frozen veggies works very well. Ice for 20 minutes multiple times per day as needed.  Do not place the ice directly on the skin as this can cause tissue damage.    C - Compression - using a compression wrap or an ACE wrap can help to decrease swelling, which can help to decrease pain. Wearing the wraps is generally not needed at night, but they should be worn on a regular basis when you are going to be on your feet for prolonged periods as gravity tends  to pull fluids down to your feet/ankles.    E - Elevation - elevating your lower extremities multiple times daily for 15-20 minutes can help to decrease swelling, which works well in decreasing pain levels.    NSAID/Tylenol - Anti-inflammatories like Aleve or ibuprofen, and/or a pain medication, such as Tylenol, can help to improve pain levels and get the issue resolved sooner rather than later. Anyone with liver issues should be careful with Tylenol, and anyone with high blood pressure or heart, stomach or kidney issues should be careful with anti-inflammatories. Please ask if you have questions about these medications, including dosage.    ANKLE SPRAIN  An ankle sprain is an injury to one or more ligaments in the ankle, usually on the outside of the ankle. Ligaments are bands of tissue - like rubber bands - that connect one bone to another and bind the joints together. In the ankle joint, ligaments provide stability by limiting side-to-side movement.  Some ankle sprains are much worse than others. The severity of an ankle sprain depends on whether the ligament is stretched, partially torn, or completely torn, as well as on the number of ligaments involved. Ankle sprains are not the same as strains, which affect muscles rather than ligaments.  CAUSES  Sprained ankles often result from a fall, a sudden twist, or a blow that forces the ankle joint out of its normal position. Ankle sprains commonly occur while participating in sports, wearing inappropriate shoes, or walking or running on an uneven surface.  Sometimes ankle sprains occur because of a person is born with weak ankles. Previous ankle or foot injuries can also weaken the ankle and lead to sprains.  SYMPTOMS  The symptoms of ankle sprains may include: pain or soreness, swelling, bruising, difficulty walking, and stiffness in the joint.  These symptoms may vary in intensity, depending on the severity of the sprain. Sometimes pain and swelling are absent in  people with previous ankle sprains. Instead, they may simply feel the ankle is wobbly and unsteady when they walk. Even if there is no pain or swelling with a sprained ankle, treatment is crucial. Any ankle sprain - whether it s your first or your fifth - requires prompt medical attention.  DIAGNOSIS  In evaluating your injury, the foot and ankle surgeon will obtain a thorough history of your symptoms and examine your foot. X-rays or other advanced imaging studies may be ordered to help determine the severity of the injury.  MEDICAL TREATMENT  There are four key reasons why an ankle sprain should be promptly evaluated and treated by a foot and ankle surgeon:  An untreated ankle sprain may lead to chronic ankle instability, a condition marked by persistent discomfort and a  giving way  of the ankle. Weakness in the leg may also develop.   A more severe ankle injury may have occurred along with the sprain. This might include a serious bone fracture that, if left untreated, could lead to troubling complications.   An ankle sprain may be accompanied by a foot injury that causes discomfort but has gone unnoticed thus far.   Rehabilitation of a sprained ankle needs to begin right away. If rehabilitation is delayed, the injury may be less likely to heal properly.   NON-SURGICAL TREATMENT  When you have an ankle sprain, rehabilitation is crucial--and it starts the moment your treatment begins. Your foot and ankle surgeon may recommend one or more of the following treatment options:  Rest. Stay off the injured ankle. Walking may cause further injury.   Ice. Apply an ice pack to the injured area, placing a thin towel between the ice and the skin. Use ice for 20 minutes and then wait at least 40 minutes before icing again.   Compression. An elastic wrap may be recommended to control swelling.   Elevation. The ankle should be raised slightly above the level of your heart to reduce swelling.   Early physical therapy. Your doctor  will start you on a rehabilitation program as soon as possible to promote healing and increase your range of motion. This includes doing prescribed exercises.   Medications. Nonsteroidal anti-inflammatory drugs (NSAIDs), such as ibuprofen, may be recommended to reduce pain and inflammation. In some cases, prescription pain medications are needed to provide adequate relief.   SURGICAL TREATMENT  In more severe cases, surgery may be required to adequately treat an ankle sprain. Surgery often involves repairing the damaged ligament or ligaments. The foot and ankle surgeon will select the surgical procedure best suited for your case based on the type and severity of your injury as well as your activity level.  After surgery, rehabilitation is extremely important. Completing your rehabilitation program is crucial to a successful outcome. Be sure to continue to see your foot and ankle surgeon during this period to ensure that your ankle heals properly and function is restored.    Follow Up: 2 or 3 weeks

## 2023-05-23 NOTE — LETTER
5/23/2023         RE: Cassie Mcfarland  [address]  [facility] [address]        Dear Colleague,    Thank you for referring your patient, Cassie Mcfarland, to the [facility]. Please see a copy of my visit note below.    ASSESSMENT:  Encounter Diagnoses   Name Primary?     Sprain of anterior talofibular ligament of right ankle, initial encounter Yes     Sprain of right ankle, unspecified ligament, initial encounter      MEDICAL DECISION MAKING:  I personally reviewed the x-ray images with Cassie and her .  The ankle mortise is congruent and intact.  No fractures seen.  Small osseous densities seen near the medial lateral gutter, likely from old injury.  Specifically no fracture involving the ankle, anterior process of the calcaneus or base of the fifth metatarsal.    PRICE therapy was reviewed  She will continue crutches based on pain level.  Aircast immobilization  She is instructed to remove the device for ankle range of motion exercises to prevent DVT and deconditioning.    Follow-up in 2 to 3 weeks for reevaluation.  At that time she might be interested in a Tri-Lock ankle brace and physical therapy.  Physical therapy was helpful after her last sprain.    Disclaimer: This note consists of symbols derived from keyboarding, dictation and/or voice recognition software. As a result, there may be errors in the script that have gone undetected. Please consider this when interpreting information found in this chart.    Crescencio Bajwa DPM, FACFAS, [facility] Department of Podiatry/Foot & Ankle Surgery      ____________________________________________________________________    HPI:       Follow-up from 8 5/21/2023 urgent care visit  Chief Complaint: Right ankle sprain  Onset of problem: Cassie reports twisting her ankle yard  Due to fairly significant pain, she is wearing an Aircast and using crutches at this time.  X-ray done in urgent care was negative for fracture.  She  "reports a history of a right ankle sprain that was more severe.  This was a trampoline injury.      *No past medical history on file.*  *No past surgical history on file.*  *  Current Outpatient Medications   Medication Sig Dispense Refill     cyclobenzaprine (FLEXERIL) 10 MG tablet Take 0.5 tablets (5 mg) by mouth 2 times daily as needed for muscle spasms 20 tablet 0     rizatriptan (MAXALT) 10 MG tablet Take 1 tablet (10 mg) by mouth at onset of headache for migraine May repeat in 2 hours. Max 3 tablets/24 hours. 30 tablet 1         EXAM:    Vitals: /78   Ht 1.676 m (5' 6\")   Wt 111.6 kg (246 lb)   BMI 39.71 kg/m    BMI: Body mass index is 39.71 kg/m .    Constitutional:  Cassie Mcfarland is in no apparent distress, appears well-nourished.  Cooperative with history and physical exam.    Vascular:  Pedal pulses are palpable for both the DP and PT arteries.  CFT < 3 sec. Mild lateral right ankle edema.     Neuro: Light touch sensation is intact to the L4, L5, S1 distributions  No evidence of weakness, spasticity, or contracture in the lower extremities.     Derm: There is some ecchymosis on the lateral aspect of the right calcaneus.  No blisters.  No other skin injury.    Musculoskeletal:    Lower extremity muscle strength is normal. No gross deformities.  Pain on palpation directly over the right anterior talofibular ligament.  Mild pain over the calcaneofibular ligament and posterior talofibular ligament.  Palpatory exam otherwise unremarkable.  No pain with stressing the right ankle syndesmosis.    EXAM: XR ANKLE RIGHT G/E 3 VIEWS  LOCATION: [facility]  DATE/TIME: 5/21/2023 1:59 PM CDT     INDICATION: Right ankle pain after injury.  COMPARISON: None.                                                                      IMPRESSION: Moderate lateral ankle soft tissue swelling. Negative for fracture. Normal joint alignment. Normal joint spacing with no significant arthritic changes. " Minimal heterotopic ossification along the anterior inferior aspect of the medial   malleolus.          Again, thank you for allowing me to participate in the care of your patient.        Sincerely,        Crescencio Bajwa DPM

## 2023-06-01 ENCOUNTER — ANCILLARY PROCEDURE (OUTPATIENT)
Dept: GENERAL RADIOLOGY | Facility: CLINIC | Age: 38
End: 2023-06-01
Attending: PHYSICIAN ASSISTANT
Payer: COMMERCIAL

## 2023-06-01 ENCOUNTER — TELEPHONE (OUTPATIENT)
Dept: INTERNAL MEDICINE | Facility: CLINIC | Age: 38
End: 2023-06-01

## 2023-06-01 ENCOUNTER — OFFICE VISIT (OUTPATIENT)
Dept: URGENT CARE | Facility: URGENT CARE | Age: 38
End: 2023-06-01
Payer: COMMERCIAL

## 2023-06-01 VITALS
OXYGEN SATURATION: 99 % | BODY MASS INDEX: 39.71 KG/M2 | WEIGHT: 246 LBS | DIASTOLIC BLOOD PRESSURE: 89 MMHG | TEMPERATURE: 99 F | HEART RATE: 83 BPM | SYSTOLIC BLOOD PRESSURE: 128 MMHG | RESPIRATION RATE: 20 BRPM

## 2023-06-01 DIAGNOSIS — M79.671 RIGHT FOOT PAIN: ICD-10-CM

## 2023-06-01 DIAGNOSIS — M79.671 RIGHT FOOT PAIN: Primary | ICD-10-CM

## 2023-06-01 PROCEDURE — 73630 X-RAY EXAM OF FOOT: CPT | Mod: TC | Performed by: RADIOLOGY

## 2023-06-01 PROCEDURE — 99214 OFFICE O/P EST MOD 30 MIN: CPT | Performed by: PHYSICIAN ASSISTANT

## 2023-06-01 RX ORDER — CEPHALEXIN 500 MG/1
500 CAPSULE ORAL 3 TIMES DAILY
Qty: 21 CAPSULE | Refills: 0 | Status: SHIPPED | OUTPATIENT
Start: 2023-06-01 | End: 2023-06-08

## 2023-06-01 NOTE — TELEPHONE ENCOUNTER
"SR Pool,     This \"red [place]\" might be unrelated to the injury. I am not sure. Will you see if she is willing to come to [place] tomorrow morning? There are openings.     Dr. Bajwa  "

## 2023-06-01 NOTE — TELEPHONE ENCOUNTER
Received a call from the patient stating she sprained her ankle and was seen in Urgent Care. Patient then saw Dr. Bajwa and was provided with a boot and crutches. Patient is scheduled to see Dr. Bajwa next week.    Appointments in Next Year    Jun 07, 2023  8:30 AM  (Arrive by 8:15 AM)  RETURN VISIT with Crescencio Bajwa DPM  [facility] ([facility] ) [phone]     Patient states 2 days ago she started to develop a red [place] with a dark red line. Originally it was marble in size and now it is a quarter in size. Patient states it is extremely painful to the touch. Patient can still wear her boot but it is painful to take off. No drainage and no fever present. Patient states there is a small amount of swelling present (not sure if it is related to the [place] or her ankle injury). Patient is wondering if this could be a possible infection?     Will route to Dr. Bajwa for review. Does patient need to be seen in ? No available appointment in IM today. Please advise.     Can we leave a detailed message on this number? YES  Phone number patient can be reached at: Cell number on file:    Telephone Information:   Mobile [phone]       Jessica Carmona RN  [facility] Triage

## 2023-06-02 NOTE — PROGRESS NOTES
URGENT CARE VISIT:    SUBJECTIVE:   Chief Complaint   Patient presents with     Urgent Care     Possible an infection on her right foot       Cassie Mcfarland is a 38 year old female who presents with a chief complaint of right foot redness.  Symptoms began 3 day(s) ago, are mild and gradual onset  She sprained ankle over a week ago and has been wearing mortensen.  Pain exacerbated by touch. Relieved by rest.  She treated it initially with no therapy. This is the first time this type of injury has occurred to this patient.     PMH: History reviewed. No pertinent past medical history.  Allergies: Patient has no known allergies.   Medications:   Current Outpatient Medications   Medication Sig Dispense Refill     cephALEXin (KEFLEX) 500 MG capsule Take 1 capsule (500 mg) by mouth 3 times daily for 7 days 21 capsule 0     rizatriptan (MAXALT) 10 MG tablet Take 1 tablet (10 mg) by mouth at onset of headache for migraine May repeat in 2 hours. Max 3 tablets/24 hours. 30 tablet 1     cyclobenzaprine (FLEXERIL) 10 MG tablet Take 0.5 tablets (5 mg) by mouth 2 times daily as needed for muscle spasms (Patient not taking: Reported on 6/1/2023) 20 tablet 0     Social History:   Social History     Tobacco Use     Smoking status: Never     Smokeless tobacco: Never   Vaping Use     Vaping status: Not on file   Substance Use Topics     Alcohol use: Yes     Alcohol/week: 2.0 standard drinks of alcohol     Types: 2 Standard drinks or equivalent per week       ROS:  Review of systems negative except as stated above.    OBJECTIVE:  /89   Pulse 83   Temp 99  F (37.2  C)   Resp 20   Wt 111.6 kg (246 lb)   SpO2 99%   BMI 39.71 kg/m    GENERAL APPEARANCE: healthy, alert and no distress  MUSCULOSKELETAL: there is localized TTP over proximal dorsum of right foot. FROM ankle.  EXTREMITIES: peripheral pulses normal  SKIN: moderate edema and ecchymosis over dorsum of right foot. There is a 2 cm area of erythema at dorsum of foot  NEURO:  sensation intact     IMAGING:  Results for orders placed or performed in visit on 06/01/23   XR Foot Right G/E 3 Views     Status: None    Narrative    EXAM: XR FOOT RIGHT G/E 3 VIEWS  LOCATION: [facility]  DATE/TIME: 6/1/2023 7:40 PM CDT    INDICATION:  Right foot pain  COMPARISON: None.      Impression    IMPRESSION: The right foot is negative for fracture. No dislocation.       ASSESSMENT:    ICD-10-CM    1. Right foot pain  M79.671 XR Foot Right G/E 3 Views     cephALEXin (KEFLEX) 500 MG capsule          PLAN:  30 minutes spent by me on the date of the encounter doing chart review, review of outside records, review of test results, interpretation of tests, patient visit and documentation   Patient Instructions   Patient was educated on the natural course of injury.  No acute fracture or dislocation seen on x-ray. I suspect area of erythema is due to pressure from boot rather than infection. If erythema increases, may start antibiotic. Conservative measures discussed including rest, ice, compression, elevation, and over-the-counter analgesics (Tylenol or Ibuprofen) as needed. See your primary care provider if symptoms worsen or do not improve in 7 days. Seek emergency care if you develop severe pain/swelling, inability to move extremity, skin paleness, or weakness.     Patient verbalized understanding and is agreeable to plan. The patient was discharged ambulatory and in stable condition.    uJana Singh PA-C on 6/1/2023 at 8:06 PM

## 2023-06-07 ENCOUNTER — OFFICE VISIT (OUTPATIENT)
Dept: PODIATRY | Facility: CLINIC | Age: 38
End: 2023-06-07
Payer: COMMERCIAL

## 2023-06-07 VITALS — DIASTOLIC BLOOD PRESSURE: 74 MMHG | BODY MASS INDEX: 39.71 KG/M2 | SYSTOLIC BLOOD PRESSURE: 108 MMHG | WEIGHT: 246 LBS

## 2023-06-07 DIAGNOSIS — S93.401A SPRAIN OF RIGHT ANKLE, UNSPECIFIED LIGAMENT, INITIAL ENCOUNTER: ICD-10-CM

## 2023-06-07 DIAGNOSIS — S93.491A SPRAIN OF ANTERIOR TALOFIBULAR LIGAMENT OF RIGHT ANKLE, INITIAL ENCOUNTER: Primary | ICD-10-CM

## 2023-06-07 PROCEDURE — 99213 OFFICE O/P EST LOW 20 MIN: CPT | Performed by: PODIATRIST

## 2023-06-07 NOTE — LETTER
6/7/2023         RE: Cassie Mcfarland  [address]  [facility] [address]        Dear Colleague,    Thank you for referring your patient, Cassie Mcfarland, to the [facility]. Please see a copy of my visit note below.    ASSESSMENT:  Encounter Diagnoses   Name Primary?     Sprain of anterior talofibular ligament of right ankle, initial encounter Yes     Sprain of right ankle, unspecified ligament, initial encounter      MEDICAL DECISION MAKING:  Cassie is recovering at a normal pace status post right ankle left inversion injury/sprain  I did offer the Tri-Lock ankle brace.  She is interested in this.  Use of the foot strap laterally is an option.  She is also interested in physical therapy for return to normal gait, strengthening, proprioceptive rehab..  The referral was placed.  Follow-up in 1 to 2 months.  If she is doing well, a my chart message is fine.  Otherwise she is welcome to follow-up in person.    Disclaimer: This note consists of symbols derived from keyboarding, dictation and/or voice recognition software. As a result, there may be errors in the script that have gone undetected. Please consider this when interpreting information found in this chart.    Crescencio Bajwa DPM, FACFAS, MS    [place] Department of Podiatry/Foot & Ankle Surgery      ____________________________________________________________________    HPI:       Cassie follows up for recheck of a right anterior talofibular ligament sprain.  I last evaluated her 5/23/2023.  Care plan involves PRICE therapy, crutches as needed, Aircast immobilization  She is doing well  Questions were discontinued  Aircast discontinued due to discomfort  She is using an ankle brace she purchased online      *No past medical history on file.*  *No past surgical history on file.*  *  Current Outpatient Medications   Medication Sig Dispense Refill     rizatriptan (MAXALT) 10 MG tablet Take 1 tablet (10 mg) by mouth at onset of  headache for migraine May repeat in 2 hours. Max 3 tablets/24 hours. 30 tablet 1     cephALEXin (KEFLEX) 500 MG capsule Take 1 capsule (500 mg) by mouth 3 times daily for 7 days (Patient not taking: Reported on 6/7/2023) 21 capsule 0     cyclobenzaprine (FLEXERIL) 10 MG tablet Take 0.5 tablets (5 mg) by mouth 2 times daily as needed for muscle spasms (Patient not taking: Reported on 6/1/2023) 20 tablet 0         EXAM:    Vitals: /74   Wt 111.6 kg (246 lb)   BMI 39.71 kg/m    BMI: Body mass index is 39.71 kg/m .    Constitutional:  Cassie Mcfarland is in no apparent distress, appears well-nourished.  Cooperative with history and physical exam.    Vascular:  Pedal pulses are palpable for both the DP and PT arteries.  CFT < 3 sec. interval reduction in right ankle edema    Neuro: Light touch sensation is intact to the L4, L5, S1 distributions  No evidence of weakness, spasticity, or contracture in the lower extremities.     Derm: Normal texture and turgor.  No erythema, ecchymosis, or cyanosis.  No open lesions.     Musculoskeletal:    Lower extremity muscle strength is normal. No gross deformities.  Mild pain over the right anterior talofibular ligament.  Minimal pain over the calcaneofibular ligament.  No other pain on palpatory exam.  No pain today with stressing the ankle syndesmosis.  Smooth right ankle range of motion.      Again, thank you for allowing me to participate in the care of your patient.        Sincerely,        Crescencio Bajwa DPM

## 2023-06-07 NOTE — PROGRESS NOTES
ASSESSMENT:  Encounter Diagnoses   Name Primary?     Sprain of anterior talofibular ligament of right ankle, initial encounter Yes     Sprain of right ankle, unspecified ligament, initial encounter      MEDICAL DECISION MAKING:  Cassie is recovering at a normal pace status post right ankle left inversion injury/sprain  I did offer the Tri-Lock ankle brace.  She is interested in this.  Use of the foot strap laterally is an option.  She is also interested in physical therapy for return to normal gait, strengthening, proprioceptive rehab..  The referral was placed.  Follow-up in 1 to 2 months.  If she is doing well, a my chart message is fine.  Otherwise she is welcome to follow-up in person.    Disclaimer: This note consists of symbols derived from keyboarding, dictation and/or voice recognition software. As a result, there may be errors in the script that have gone undetected. Please consider this when interpreting information found in this chart.    Crescencio Bajwa DPM, FACFAS, MS    [place] Department of Podiatry/Foot & Ankle Surgery      ____________________________________________________________________    HPI:       Cassie follows up for recheck of a right anterior talofibular ligament sprain.  I last evaluated her 5/23/2023.  Care plan involves PRICE therapy, crutches as needed, Aircast immobilization  She is doing well  Questions were discontinued  Aircast discontinued due to discomfort  She is using an ankle brace she purchased online      *No past medical history on file.*  *No past surgical history on file.*  *  Current Outpatient Medications   Medication Sig Dispense Refill     rizatriptan (MAXALT) 10 MG tablet Take 1 tablet (10 mg) by mouth at onset of headache for migraine May repeat in 2 hours. Max 3 tablets/24 hours. 30 tablet 1     cephALEXin (KEFLEX) 500 MG capsule Take 1 capsule (500 mg) by mouth 3 times daily for 7 days (Patient not taking: Reported on 6/7/2023) 21 capsule 0     cyclobenzaprine  (FLEXERIL) 10 MG tablet Take 0.5 tablets (5 mg) by mouth 2 times daily as needed for muscle spasms (Patient not taking: Reported on 6/1/2023) 20 tablet 0         EXAM:    Vitals: /74   Wt 111.6 kg (246 lb)   BMI 39.71 kg/m    BMI: Body mass index is 39.71 kg/m .    Constitutional:  Cassie Mcfarland is in no apparent distress, appears well-nourished.  Cooperative with history and physical exam.    Vascular:  Pedal pulses are palpable for both the DP and PT arteries.  CFT < 3 sec. interval reduction in right ankle edema    Neuro: Light touch sensation is intact to the L4, L5, S1 distributions  No evidence of weakness, spasticity, or contracture in the lower extremities.     Derm: Normal texture and turgor.  No erythema, ecchymosis, or cyanosis.  No open lesions.     Musculoskeletal:    Lower extremity muscle strength is normal. No gross deformities.  Mild pain over the right anterior talofibular ligament.  Minimal pain over the calcaneofibular ligament.  No other pain on palpatory exam.  No pain today with stressing the ankle syndesmosis.  Smooth right ankle range of motion.

## 2023-06-09 NOTE — TELEPHONE ENCOUNTER
Upon chart review, patient had an appointment with podiatry on 6/7/23.    Closing encounter.    Jessica Carmona RN

## 2023-07-05 ENCOUNTER — THERAPY VISIT (OUTPATIENT)
Dept: PHYSICAL THERAPY | Facility: CLINIC | Age: 38
End: 2023-07-05
Attending: PODIATRIST
Payer: COMMERCIAL

## 2023-07-05 DIAGNOSIS — S93.491A SPRAIN OF ANTERIOR TALOFIBULAR LIGAMENT OF RIGHT ANKLE, INITIAL ENCOUNTER: ICD-10-CM

## 2023-07-05 DIAGNOSIS — S93.401A SPRAIN OF RIGHT ANKLE, UNSPECIFIED LIGAMENT, INITIAL ENCOUNTER: ICD-10-CM

## 2023-07-05 DIAGNOSIS — S93.491D SPRAIN OF ANTERIOR TALOFIBULAR LIGAMENT OF RIGHT ANKLE, SUBSEQUENT ENCOUNTER: ICD-10-CM

## 2023-07-05 PROCEDURE — 97110 THERAPEUTIC EXERCISES: CPT | Mod: GP | Performed by: PHYSICAL THERAPIST

## 2023-07-05 PROCEDURE — 97161 PT EVAL LOW COMPLEX 20 MIN: CPT | Mod: GP | Performed by: PHYSICAL THERAPIST

## 2023-07-05 NOTE — PROGRESS NOTES
"PHYSICAL THERAPY EVALUATION  Type of Visit: Evaluation    See electronic medical record for Abuse and Falls Screening details.    Subjective    Sprained R ankle 5-20-23 running on uneven terrain. X-rays negative. In boot x 1 week, then brace for 1 week. Improving. Still doesn't feel normal and stable with walking although not limping. Notes swelling at lateral ankle. Had significant ankle sprain 12 years ago on right.       Presenting condition or subjective complaint: right ankle sprain, running on uneven terrain and turned ankle  Date of onset: 05/20/23    Relevant medical history:     Dates & types of surgery:      Prior diagnostic imaging/testing results: X-ray     Prior therapy history for the same diagnosis, illness or injury: No      Prior Level of Function   Transfers: Independent  Ambulation: Independent  ADL: Independent    Living Environment  Social support: With a significant other or spouse   Type of home: House; Basement   Stairs to enter the home: Yes 3     Ramp: No   Stairs inside the home: Yes   Is there a railing: Yes   Help at home:    Equipment owned:       Employment: Yes [employer]  Hobbies/Interests: Walking, hiking, reading    Patient goals for therapy: Get full ROM and strength       Objective   FOOT/ANKLE EVALUATION  PAIN: Pain Level at Rest: 0/10  Pain Level with Use: 2/10  Pain Location: ankle and lateral  Pain Quality: Aching  Pain Frequency: intermittent  Pain is Worst: daytime  Pain is Exacerbated By: prolonged standing or walking  Pain is Relieved By: rest  Pain Progression: Improved  INTEGUMENTARY (edema, incisions): 26.0 cm around malleoli on L, 24.5 cm on R  GAIT:   Weightbearing Status: WBAT  Assistive Device(s): None  Gait Deviations: WNL  BALANCE/PROPRIOCEPTION: Single Leg Stance Eyes Open (seconds): R and L>60\" with significant ankle movement on R, Single Leg Stance Eyes Closed (seconds): L=27\", R=2\"  WEIGHT BEARING ALIGNMENT: WNL  NON-WEIGHTBEARING ALIGNMENT: WNL   ROM: "   (Degrees) Left AROM Left PROM  Right AROM Right PROM   Ankle Dorsiflexion 14  10    Ankle Plantarflexion 52  48    Ankle Inversion 35  32    Ankle Eversion 15  12    Great Toe Flexion       Great Toe Extension       Pain:   End feel:   STRENGTH:   Pain: - none + mild ++ moderate +++ severe  Strength Scale: 0-5/5 Left Right   Ankle Dorsiflexion 5 4+, + (mild)   Ankle Plantarflexion 5 4+   Ankle Inversion 5 5-   Ankle Eversion 5 4+, + (mild)   Great Toe Flexion     Great Toe Extension     Anterior Tibialis     Posterior Tibialis     Peroneals     Extensor Digitorum     Gastroc/Soleus       SPECIAL TESTS:    Left Right   Tinel Sign     Jewell Sign     Windlass Test     Ligamentous Stability     Anterior Drawer Negative,   Negative   Kleiger ER Test     Longitudinal Arch Angle Test     Talar Tilt Negative,   Negative,         FUNCTIONAL TESTS: Double Leg Squat: Anterior knee translation and can do a partial squat only d/t R ankle tightness/instability  Single Leg Squat: Anterior knee translation, Knee valgus, Hip internal rotation and Improper use of glutes/hips  SLS: See above for SLS EO and EC  PALPATION: TTP to ATFL on R    Assessment & Plan   CLINICAL IMPRESSIONS   Medical Diagnosis: Sprain of anterior talofibular ligament of right ankle    Treatment Diagnosis: R lateral ankle sprain   Impression/Assessment: Patient is a 38 year old female with right ankle complaints.  The following significant findings have been identified: Pain, Decreased ROM/flexibility, Decreased strength, Decreased proprioception, Edema and Decreased activity tolerance. These impairments interfere with their ability to perform recreational activities, household chores, household mobility and community mobility as compared to previous level of function.     Clinical Decision Making (Complexity):   Clinical Presentation: Stable/Uncomplicated  Clinical Presentation Rationale: based on medical and personal factors listed in PT  evaluation  Clinical Decision Making (Complexity): Low complexity    PLAN OF CARE  Treatment Interventions:  Interventions: Manual Therapy, Neuromuscular Re-education, Therapeutic Activity, Therapeutic Exercise, Self-Care/Home Management    Long Term Goals     PT Goal 1  Goal Identifier: Goal 1 --walking  Goal Description: Walk 1 mile with 0/10 pain or difficulty.  Rationale: to maximize safety and independence within the community  Target Date: 08/16/23      Frequency of Treatment: 1x/week  Duration of Treatment: 6 weeks    Education Assessment:        Risks and benefits of evaluation/treatment have been explained.   Patient/Family/caregiver agrees with Plan of Care.     Evaluation Time:     PT Eval, Low Complexity Minutes (43377): 20      Signing Clinician: Seda Bullock PT

## 2023-07-26 ENCOUNTER — PRE VISIT (OUTPATIENT)
Dept: NEUROLOGY | Facility: CLINIC | Age: 38
End: 2023-07-26

## 2023-08-09 PROBLEM — S93.491A SPRAIN OF ANTERIOR TALOFIBULAR LIGAMENT OF RIGHT ANKLE: Status: RESOLVED | Noted: 2023-07-05 | Resolved: 2023-08-09

## 2024-03-02 ENCOUNTER — HEALTH MAINTENANCE LETTER (OUTPATIENT)
Age: 39
End: 2024-03-02

## 2024-03-07 ENCOUNTER — OFFICE VISIT (OUTPATIENT)
Dept: INTERNAL MEDICINE | Facility: CLINIC | Age: 39
End: 2024-03-07
Payer: COMMERCIAL

## 2024-03-07 VITALS
DIASTOLIC BLOOD PRESSURE: 80 MMHG | WEIGHT: 249.7 LBS | RESPIRATION RATE: 16 BRPM | HEART RATE: 96 BPM | HEIGHT: 66 IN | OXYGEN SATURATION: 100 % | SYSTOLIC BLOOD PRESSURE: 116 MMHG | TEMPERATURE: 98.7 F | BODY MASS INDEX: 40.13 KG/M2

## 2024-03-07 DIAGNOSIS — G43.109 MIGRAINE WITH AURA AND WITHOUT STATUS MIGRAINOSUS, NOT INTRACTABLE: ICD-10-CM

## 2024-03-07 DIAGNOSIS — R25.1 TREMOR: Primary | ICD-10-CM

## 2024-03-07 PROCEDURE — 99213 OFFICE O/P EST LOW 20 MIN: CPT | Performed by: INTERNAL MEDICINE

## 2024-03-07 RX ORDER — RIZATRIPTAN BENZOATE 10 MG/1
10 TABLET ORAL
Qty: 15 TABLET | Refills: 1 | Status: SHIPPED | OUTPATIENT
Start: 2024-03-07

## 2024-03-07 NOTE — PATIENT INSTRUCTIONS
No indication for any serious neurological diseases, I suspect that your tremor may be caused by carpal tunnel syndrome.     Follow directions for conservative    If you get better, then no further workup or treatment required.     If you still have tremor and weakness in the right hand to any significant degree after 4 weeks of using the brace, then you should see neurology, referral ordered, make the appointment.       [facility] [place]   [address]   [facility] [address]   Phone: [phone]   Fax: [phone]           CARPAL TUNNEL SYNDROME:     *  The numbness and tingling in your hand(s) is caused by carpal tunnel syndrome, an irritation and inflammation of the median nerve at your wrist.     *  Use a wrist brace to help keep the wrist in a neutral position to help take pressure off that nerve.              *  Specifically wear the wrist brace when you are sleeping, when you drive, when you use the computer, and any other time that you are active with your hands.  It is OK to take the wrist brace to bathe or any other time.    *  Avoid or modify activities that require lots of hand, or wrist movements.      *  If you continue to have symptoms dsepite these conservative measures, then you may need to see the orthopedic hand surgeons for consideration of a surgical procedure to release the pressure in the carpal tunnel to cure the carpal tunnel syndrome.  Let us know if this is the case and we can refer you.

## 2024-03-07 NOTE — PROGRESS NOTES
"  Assessment & Plan   Problem List Items Addressed This Visit    None  Visit Diagnoses       Tremor    -  Primary    Relevant Orders    Adult Neurology  Referral    Migraine with aura and without status migrainosus, not intractable        Relevant Medications    rizatriptan (MAXALT) 10 MG tablet               No indication for any serious neurological diseases, I suspect that your tremor may be caused by carpal tunnel syndrome.     Follow directions for conservative    If you get better, then no further workup or treatment required.     If you still have tremor and weakness in the right hand to any significant degree after 4 weeks of using the brace, then you should see neurology, referral ordered, make the appointment.       [facility] [place]   [address]   [facility] [address]   Phone: [phone]   Fax: [phone]           CARPAL TUNNEL SYNDROME:     *  The numbness and tingling in your hand(s) is caused by carpal tunnel syndrome, an irritation and inflammation of the median nerve at your wrist.     *  Use a wrist brace to help keep the wrist in a neutral position to help take pressure off that nerve.        *  Specifically wear the wrist brace when you are sleeping, when you drive, when you use the computer, and any other time that you are active with your hands.  It is OK to take the wrist brace to bathe or any other time.    *  Avoid or modify activities that require lots of hand, or wrist movements.      *  If you continue to have symptoms dsepite these conservative measures, then you may need to see the orthopedic hand surgeons for consideration of a surgical procedure to release the pressure in the carpal tunnel to cure the carpal tunnel syndrome.  Let us know if this is the case and we can refer you.           BMI  Estimated body mass index is 40.3 kg/m  as calculated from the following:    Height as of this encounter: 1.676 m (5' 6\").    Weight as of this " "encounter: 113.3 kg (249 lb 11.2 oz).             Aniyah Matthews is a 38 year old, presenting for the following health issues:  Musculoskeletal Problem (Right hand and arm numbness)        3/7/2024     9:19 AM   Additional Questions   Roomed by Jacquelyn LAGUNA CMA     History of Present Illness       Reason for visit:  Numbness in right hand and arm  Symptom onset:  More than a month  Symptoms include:  Numbness in right arm  Symptom intensity:  Mild  Symptom progression:  Improving  Had these symptoms before:  No  What makes it worse:  No  What makes it better:  No    She eats 2-3 servings of fruits and vegetables daily.She consumes 0 sweetened beverage(s) daily.She exercises with enough effort to increase her heart rate 30 to 60 minutes per day.  She exercises with enough effort to increase her heart rate 4 days per week.   She is taking medications regularly.         **I reviewed the information recorded in the patient's EPIC chart (including but not limited to medical history, surgical history, family history, problem list, medication list, and allergy list) and updated the information as indicated based on the patients reported information.         Review of Systems  Constitutional, HEENT, cardiovascular, pulmonary, gi and gu systems are negative, except as otherwise noted.      Objective    /80   Pulse 96   Temp 98.7  F (37.1  C) (Tympanic)   Resp 16   Ht 1.676 m (5' 6\")   Wt 113.3 kg (249 lb 11.2 oz)   LMP 02/19/2024 (Exact Date)   SpO2 100%   Breastfeeding No   BMI 40.30 kg/m    Body mass index is 40.3 kg/m .  Physical Exam   GENERAL alert and no distress  EYES:  Normal sclera,conjunctiva, EOMI  HENT: oral and posterior pharynx without lesions or erythema, facies symmetric  NECK: Neck supple. No LAD, without thyroidmegaly.  Negative spurlings maneuver  RESP: Clear to ausculation bilaterally without wheezes or crackles. Normal BS in all fields.  CV: RRR normal S1S2 without murmurs, rubs or " john.  LYMPH: no cervical lymph adenopathy appreciated  MS: extremities- no gross deformities of the visible extremities noted,   EXT:  no lower extremity edema  PSYCH: Alert and oriented times 3; speech- coherent  SKIN:  No obvious significant skin lesions on visible portions of face               Signed Electronically by: Job Fortune MD

## 2024-05-16 ENCOUNTER — NURSE TRIAGE (OUTPATIENT)
Dept: INTERNAL MEDICINE | Facility: CLINIC | Age: 39
End: 2024-05-16
Payer: COMMERCIAL

## 2024-05-16 NOTE — TELEPHONE ENCOUNTER
"Patient having breast pain rating 2/10 that is intermittent for past 2 weeks.    No history of breast issues.   No known injury.  Mother passed from breast cancer.  Patient denies redness/lumps/dimples/change in shape/nipple discharge.      Dispo: See in office within next 3 days  Patient requests to be seen Monday. Appt scheduled with acute provider.     Future Appointments 5/16/2024 - 11/12/2024        Date Visit Type Length Department Provider     5/20/2024  8:30 AM OFFICE VISIT 30 min  INTERNAL MEDICINE Sondra Cuba PA-C    Location Instructions:     [facility] is in the [facility] at [address] in [place]. This just east of the [facility] [address] exit off of [address]. Free parking is available; access the lot from [address] [place] [address] or [facility].                        Reason for Disposition   Patient wants to be seen    Additional Information   Negative: Chest pain   Negative: Breastfeeding questions about baby   Negative: Breastfeeding questions about mother (breast symptoms or feeling sick)   Negative: Breastfeeding questions about mother's medicines and diet   Negative: Postpartum breast pain and swelling, not breastfeeding   Negative: Small spot, skin growth or mole   Negative: SEVERE breast pain and fever > 103 F  (39.4 C)   Negative: Patient sounds very sick or weak to the triager   Negative: Breast looks infected (spreading redness, feels hot or painful to touch) and fever   Negative: Breast looks infected (spreading redness, feels hot or painful to touch) and no fever   Negative: Painful rash and multiple small blisters grouped together (i.e., dermatomal distribution or \"band\" or \"stripe\")   Negative: Cuts, burns, or bruises of breasts and suspicious history for the injury   Negative: Dry flaking-peeling skin of nipple   Negative: Change in shape or appearance of breast   Negative: Dimpling of skin of breast (i.e., skin looks like the outside of an " "orange peel)   Negative: Breast lump   Negative: Nipple discharge and bloody   Negative: Nipple is inverted (i.e., points inward)  (Exception: Long-term physical characteristic, present for many years.)    Answer Assessment - Initial Assessment Questions  1. SYMPTOM: \"What's the main symptom you're concerned about?\"  (e.g., lump, pain, rash, nipple discharge)      Breast pain  2. LOCATION: \"Where is the pain located?\"      Towards the top of R breast  3. ONSET: \"When did pain  start?\"      2 weeks ago  4. PRIOR HISTORY: \"Do you have any history of prior problems with your breasts?\" (e.g., lumps, cancer, fibrocystic breast disease)      No - Mother passed from breast cancer  5. CAUSE: \"What do you think is causing this symptom?\"      Unsure  6. OTHER SYMPTOMS: \"Do you have any other symptoms?\" (e.g., fever, breast pain, redness or rash, nipple discharge)      Breast pain  7. PREGNANCY-BREASTFEEDING: \"Is there any chance you are pregnant?\" \"When was your last menstrual period?\" \"Are you breastfeeding?\"      No. LMP last week.    Protocols used: Breast Symptoms-A-[place]    "

## 2024-05-16 NOTE — TELEPHONE ENCOUNTER
Reason for Call:  Appointment Request    Patient requesting this type of appt:  Intermittent right breast pain 2 weeks     Requested provider:  first available     Reason patient unable to be scheduled: Not within requested timeframe    When does patient want to be seen/preferred time:  as soon as possible     Comments: call patient     Could we send this information to you in [facility] or would you prefer to receive a phone call?:   Patient would prefer a phone call   Okay to leave a detailed message?: Yes at Cell number on file:    Telephone Information:   Mobile [phone]       Call taken on 5/16/2024 at 2:12 PM by Deidre Barry

## 2024-05-20 ENCOUNTER — OFFICE VISIT (OUTPATIENT)
Dept: INTERNAL MEDICINE | Facility: CLINIC | Age: 39
End: 2024-05-20
Payer: COMMERCIAL

## 2024-05-20 VITALS
OXYGEN SATURATION: 96 % | SYSTOLIC BLOOD PRESSURE: 110 MMHG | TEMPERATURE: 98.2 F | WEIGHT: 259.2 LBS | HEART RATE: 84 BPM | HEIGHT: 66 IN | DIASTOLIC BLOOD PRESSURE: 78 MMHG | RESPIRATION RATE: 18 BRPM | BODY MASS INDEX: 41.66 KG/M2

## 2024-05-20 DIAGNOSIS — N64.4 BREAST PAIN, RIGHT: Primary | ICD-10-CM

## 2024-05-20 PROCEDURE — 99213 OFFICE O/P EST LOW 20 MIN: CPT | Performed by: PHYSICIAN ASSISTANT

## 2024-05-20 NOTE — PROGRESS NOTES
"  Assessment & Plan     Breast pain, right    - US Breast Right Limited 1-3 Quadrants; Future  - MA Diagnostic Digital Bilateral; Future          BMI  Estimated body mass index is 41.84 kg/m  as calculated from the following:    Height as of this encounter: 1.676 m (5' 6\").    Weight as of this encounter: 117.6 kg (259 lb 3.2 oz).   Weight management plan: Patient was referred to their PCP to discuss a diet and exercise plan.    With family hx of breast cancer in mother dx at 50   Reviewed diagnostic mammogram         Aniyah Matthews is a 39 year old, presenting for the following health issues:  Breast Pain        5/20/2024     8:18 AM   Additional Questions   Roomed by Kristen   Accompanied by Self     History of Present Illness       Reason for visit:  Breast pain  Symptom onset:  1-2 weeks ago    She eats 2-3 servings of fruits and vegetables daily.She consumes 0 sweetened beverage(s) daily.She exercises with enough effort to increase her heart rate 30 to 60 minutes per day.  She exercises with enough effort to increase her heart rate 3 or less days per week.   She is taking medications regularly.       Noted right breast pain about 2 weeks ago lasted one day and then was gone.  Then a few days ago the pain returned in the right breast  No left breast pain  No lumps bumps or redness seen.   Patient had menses last week   Fam hx of breast cancer mother - dx age 50     Results via breast center.            Review of Systems  Constitutional, HEENT, cardiovascular, pulmonary, gi and gu systems are negative, except as otherwise noted.      Objective    /78   Pulse 84   Temp 98.2  F (36.8  C) (Temporal)   Resp 18   Ht 1.676 m (5' 6\")   Wt 117.6 kg (259 lb 3.2 oz)   LMP 05/13/2024 (Approximate)   SpO2 96%   BMI 41.84 kg/m    Body mass index is 41.84 kg/m .  Physical Exam   GENERAL: alert and no distress  BREAST: no palpable axillary masses or adenopathy, tenderness right lateral breast, and no lumps or " bumps noted  No skin changes   MS: no gross musculoskeletal defects noted, no edema            Signed Electronically by: Sondra Cuba PA-C

## 2024-05-24 ENCOUNTER — HOSPITAL ENCOUNTER (OUTPATIENT)
Dept: MAMMOGRAPHY | Facility: CLINIC | Age: 39
Discharge: HOME | End: 2024-05-24
Attending: PHYSICIAN ASSISTANT
Payer: COMMERCIAL

## 2024-05-24 DIAGNOSIS — N64.4 BREAST PAIN, RIGHT: ICD-10-CM

## 2024-05-24 PROCEDURE — 77062 BREAST TOMOSYNTHESIS BI: CPT

## 2024-10-16 ENCOUNTER — OFFICE VISIT (OUTPATIENT)
Dept: INTERNAL MEDICINE | Facility: CLINIC | Age: 39
End: 2024-10-16
Payer: COMMERCIAL

## 2024-10-16 VITALS
TEMPERATURE: 97.7 F | DIASTOLIC BLOOD PRESSURE: 80 MMHG | OXYGEN SATURATION: 98 % | BODY MASS INDEX: 41.52 KG/M2 | HEART RATE: 91 BPM | HEIGHT: 67 IN | SYSTOLIC BLOOD PRESSURE: 114 MMHG | WEIGHT: 264.5 LBS

## 2024-10-16 DIAGNOSIS — G43.109 MIGRAINE WITH AURA AND WITHOUT STATUS MIGRAINOSUS, NOT INTRACTABLE: Primary | ICD-10-CM

## 2024-10-16 DIAGNOSIS — S86.899A MEDIAL TIBIAL STRESS SYNDROME, UNSPECIFIED LATERALITY, INITIAL ENCOUNTER: ICD-10-CM

## 2024-10-16 DIAGNOSIS — M25.511 CHRONIC RIGHT SHOULDER PAIN: ICD-10-CM

## 2024-10-16 DIAGNOSIS — L29.9 ITCHING: ICD-10-CM

## 2024-10-16 DIAGNOSIS — G89.29 CHRONIC RIGHT SHOULDER PAIN: ICD-10-CM

## 2024-10-16 PROCEDURE — 99214 OFFICE O/P EST MOD 30 MIN: CPT | Performed by: NURSE PRACTITIONER

## 2024-10-16 PROCEDURE — G2211 COMPLEX E/M VISIT ADD ON: HCPCS | Performed by: NURSE PRACTITIONER

## 2024-10-16 RX ORDER — IBUPROFEN 200 MG
400 TABLET ORAL EVERY 6 HOURS PRN
COMMUNITY

## 2024-10-16 NOTE — PATIENT INSTRUCTIONS
To schedule an appointment with neurology, call [phone].     2. To help soothe itchy skin, try the following:  -Apply a cold, wet cloth or ice pack to the skin that itches. Do this for about five to 10 minutes or until the itch subsides.  -Take an oatmeal bath. This can be very soothing, especially for blisters or oozing skin due to chickenpox, hives, poison ivy or sunburn.  -Moisturize your skin. Always choose a moisturizer free of additives, fragrances and perfumes.  -Apply topical anesthetics that contain pramoxine.  -Apply cooling agents, such as menthol or calamine. You could also place your moisturizer in the refrigerator to help achieve this cooling effect.    While treating your skin, avoid scratching, as this will further irritate your skin and could increase your risk for a skin infection. It s also a good idea to take steps to help prevent your skin from itching.    To help prevent itching, try the following:  -Bath with lukewarm - not hot - water. Try to limit your bath or shower to just 10 minutes.  -Always use  fragrance-free  lotions, soaps and detergents to minimize irritation. Be wary of products labeled  unscented,  as they might still have chemicals that can irritate your skin.  -If you are using topical medication, apply medication before moisturizing. Then apply your moisturizer to all areas of your skin, including areas treated with medication.  -Wear loose-fitting, cotton clothes. Wool and other rough-feeling fabrics can irritate your skin.  -Avoid extreme temperature changes. Maintain a relatively cool, neutral humidity environment in your house. Use a humidifier during winter if you are prone to dry skin and eczema.  -Reduce stress. Stress can make your itch worse.    Source: https://www.aad.org/public/everyday-care/itchy-skin/itch-relief/relieve-itchy-skin    3. OK to continue Zyrtec long-term.  Cheaper over-the-counter option is loratadine (generic for Claritin). You can get a year  supply for around $15 ([employer] or [employer] brand).

## 2024-10-16 NOTE — PROGRESS NOTES
Assessment & Plan     (G43.109) Migraine with aura and without status migrainosus, not intractable  (primary encounter diagnosis)  Comment: Will try CGRP (ubrogepant) since triptans + ibuprofen are not effective enough to reduce pain below 7/10. She has tried both sumatriptan and rizatriptan, with similar effect. Has never tried CGRP medication.  She does not have migraines frequently enough to warrant prophylactic treatment.  Gave information on neurology referral so she can self-schedule, if desired.  Plan: Ubrelvy    (M25.511,  G89.29) Chronic right shoulder pain  Comment: Refer to PT.  Continue supportive cares: rest as needed, remain as active as tolerated, acetaminophen/ibuprofen as needed, heat/ice therapy as needed.  Plan: Physical Therapy  Referral    (F92.742L) Medial tibial stress syndrome, unspecified laterality, initial encounter  Comment: Refer to PT.  Continue supportive cares: rest as needed, remain as active as tolerated, acetaminophen/ibuprofen as needed, heat/ice therapy as needed.  Plan: Physical Therapy  Referral    (L29.9) Itching  Comment: OK to take daily antihistamine, if helpful.  Out-of-pocket cost with prescribed second-generation antihistamine is still more expensive than purchasing this product over-the-counter.  This was discussed with Cassie.  Reviewed basic principles of managing and preventing itch; see AVS for details.     See Patient Instructions  She will make a follow-up appointment to update Pap.    The longitudinal plan of care for the diagnosis(es)/condition(s) as documented were addressed during this visit. Due to the added complexity in care, I will continue to support Cassie in the subsequent management and with ongoing continuity of care.        Aniyah Matthews is a 39 year old, presenting for the following health issues:    Establish Care, Migraine (Refill rizatriptan ), and Ear Problem (R ear feels plugged)    History of Present Illness       Reason  "for visit:  General checkup   She is taking medications regularly.     Here to establish care. Prefers a female provider.  Migraine with aura: <15 headaches per month (typically 2-4x/year). Typically triggered by barometric pressure changes. Occasionally stress-related, but recently changed jobs so thinks that will be less of an issue. Does grind teeth when she is sleeping, but wears a mouth guard and follows with a head and neck pain clinic provider. +vision changes and photophobia with headaches. Aura typically lasts 30 minutes. No phonophobia. Rizatriptan + ibuprofen have not been particularly helpful; pain still 7/10. However, rizatriptan keeps the nausea and emesis away. Rest in a darkened room is somewhat helpful. Was previously referred to neurology but is not sure it would be helpful given infrequent nature of migraines.  Right shoulder: Was working with a  about 18 months ago who \"I think had me lift more weight than I should have, and I kind of wrecked my shoulder.\" Can lift light weights currently, but has pain if she does it a couple days in a row. Interested in PT referral.  Also struggling with shin splints x8 months; recurrent episode. A 30 minute walk can significantly trigger symptoms. Has tried stretching, heat, ice, massage. Hasn't tried NSAIDs. Symptoms are improved from initial episode, but \"they just linger.\" Would like PT referral for this as well.  Allergies: Father has anaphylactic bee allergy. She has never been stung, but wonders if she needs to take any precautions.  Pruritus: Seen dermatology x2, who advised not scratching. Zyrtec is helpful; takes 10 mg every other day. Wondering if OK to take antihistamines long-term.  Wondering about a dietician referral. Discussed contacting her insurance to verify coverage; if covered, will send a referral.  Right ear might be plugged with wax; will evaluate at next visit.    Is due for annual exam with pap. Has never had pap exam. " "Will schedule a separate appoint ment for this.  Not using contraception; \"me and my  are extremely infertile.\"  has MS and medication affects his fertility.    Review of Systems  Constitutional, HEENT, cardiovascular, pulmonary, GI, , musculoskeletal, neuro, skin, endocrine and psych systems are negative, except as otherwise noted.      Objective    /80   Pulse 91   Temp 97.7  F (36.5  C) (Temporal)   Ht 1.689 m (5' 6.5\")   Wt 120 kg (264 lb 8 oz)   LMP 10/01/2024 (Exact Date)   SpO2 98%   BMI 42.05 kg/m    Body mass index is 42.05 kg/m .  Physical Exam  Vitals and nursing note reviewed.   Constitutional:       General: She is not in acute distress.     Appearance: Normal appearance.   Cardiovascular:      Rate and Rhythm: Normal rate and regular rhythm.      Pulses: Normal pulses.      Heart sounds: Normal heart sounds. No murmur heard.     No friction rub. No gallop.   Pulmonary:      Effort: Pulmonary effort is normal. No respiratory distress.      Breath sounds: Normal breath sounds. No wheezing, rhonchi or rales.   Musculoskeletal:         General: No swelling.      Right shoulder: Tenderness present. No swelling, bony tenderness or crepitus. Normal range of motion. Normal strength.      Comments: Point tenderness at AC joint. Negative Lo, Neer, and empty can tests.  Pain at AC joint with adduction and internal rotation.  Strength and sensation bilateral arms is grossly normal   Skin:     General: Skin is warm and dry.   Neurological:      General: No focal deficit present.      Mental Status: She is alert and oriented to person, place, and time.      Cranial Nerves: Cranial nerves 2-12 are intact.      Motor: Motor function is intact.      Coordination: Coordination is intact.      Gait: Gait is intact.   Psychiatric:         Mood and Affect: Mood normal.         Behavior: Behavior normal.         Thought Content: Thought content normal.         Judgment: Judgment normal. "           Signed Electronically by: ARLYN Isabel CNP

## 2024-10-21 ENCOUNTER — THERAPY VISIT (OUTPATIENT)
Dept: PHYSICAL THERAPY | Facility: CLINIC | Age: 39
End: 2024-10-21
Attending: NURSE PRACTITIONER
Payer: COMMERCIAL

## 2024-10-21 DIAGNOSIS — S86.899A MEDIAL TIBIAL STRESS SYNDROME, UNSPECIFIED LATERALITY, INITIAL ENCOUNTER: ICD-10-CM

## 2024-10-21 DIAGNOSIS — M25.511 CHRONIC RIGHT SHOULDER PAIN: ICD-10-CM

## 2024-10-21 DIAGNOSIS — M79.605 BILATERAL LEG PAIN: Primary | ICD-10-CM

## 2024-10-21 DIAGNOSIS — M79.604 BILATERAL LEG PAIN: Primary | ICD-10-CM

## 2024-10-21 DIAGNOSIS — G89.29 CHRONIC RIGHT SHOULDER PAIN: ICD-10-CM

## 2024-10-21 PROCEDURE — 97110 THERAPEUTIC EXERCISES: CPT | Mod: GP | Performed by: PHYSICAL THERAPIST

## 2024-10-21 PROCEDURE — 97530 THERAPEUTIC ACTIVITIES: CPT | Mod: GP | Performed by: PHYSICAL THERAPIST

## 2024-10-21 PROCEDURE — 97161 PT EVAL LOW COMPLEX 20 MIN: CPT | Mod: GP | Performed by: PHYSICAL THERAPIST

## 2024-10-21 ASSESSMENT — ACTIVITIES OF DAILY LIVING (ADL)
KNEEL ON THE FRONT OF YOUR KNEE: ACTIVITY IS MINIMALLY DIFFICULT
RAW_SCORE: 41
SQUAT: ACTIVITY IS FAIRLY DIFFICULT
STIFFNESS: THE SYMPTOM AFFECTS MY ACTIVITY MODERATELY
KNEEL ON THE FRONT OF YOUR KNEE: ACTIVITY IS MINIMALLY DIFFICULT
WASHING_YOUR_HAIR?: 0
KNEE_ACTIVITY_OF_DAILY_LIVING_SUM: 41
PLEASE_INDICATE_YOR_PRIMARY_REASON_FOR_REFERRAL_TO_THERAPY:: SHOULDER
CARRYING_A_HEAVY_OBJECT_OF_10_POUNDS: 2
WEAKNESS: THE SYMPTOM AFFECTS MY ACTIVITY MODERATELY
GO DOWN STAIRS: ACTIVITY IS SOMEWHAT DIFFICULT
GO UP STAIRS: ACTIVITY IS SOMEWHAT DIFFICULT
WEAKNESS: THE SYMPTOM AFFECTS MY ACTIVITY MODERATELY
PUTTING_ON_YOUR_PANTS: 0
GIVING WAY, BUCKLING OR SHIFTING OF KNEE: THE SYMPTOM AFFECTS MY ACTIVITY MODERATELY
STAND: ACTIVITY IS NOT DIFFICULT
LIMPING: THE SYMPTOM AFFECTS MY ACTIVITY MODERATELY
PAIN: THE SYMPTOM AFFECTS MY ACTIVITY MODERATELY
STIFFNESS: THE SYMPTOM AFFECTS MY ACTIVITY MODERATELY
PAIN: THE SYMPTOM AFFECTS MY ACTIVITY MODERATELY
WALK: ACTIVITY IS FAIRLY DIFFICULT
TOUCHING_THE_BACK_OF_YOUR_NECK: 1
SWELLING: THE SYMPTOM AFFECTS MY ACTIVITY MODERATELY
SQUAT: ACTIVITY IS FAIRLY DIFFICULT
STAND: ACTIVITY IS NOT DIFFICULT
GO UP STAIRS: ACTIVITY IS SOMEWHAT DIFFICULT
PUSHING_WITH_THE_INVOLVED_ARM: 2
PUTTING_ON_AN_UNDERSHIRT_OR_A_PULLOVER_SWEATER: 0
REMOVING_SOMETHING_FROM_YOUR_BACK_POCKET: 0
LIMPING: THE SYMPTOM AFFECTS MY ACTIVITY MODERATELY
WHEN_LYING_ON_THE_INVOLVED_SIDE: 0
SWELLING: THE SYMPTOM AFFECTS MY ACTIVITY MODERATELY
WALK: ACTIVITY IS FAIRLY DIFFICULT
RISE FROM A CHAIR: ACTIVITY IS NOT DIFFICULT
HOW_WOULD_YOU_RATE_THE_OVERALL_FUNCTION_OF_YOUR_KNEE_DURING_YOUR_USUAL_DAILY_ACTIVITIES?: NEARLY NORMAL
SIT WITH YOUR KNEE BENT: ACTIVITY IS NOT DIFFICULT
PLEASE_INDICATE_YOR_PRIMARY_REASON_FOR_REFERRAL_TO_THERAPY:: KNEE
GO DOWN STAIRS: ACTIVITY IS SOMEWHAT DIFFICULT
SIT WITH YOUR KNEE BENT: ACTIVITY IS NOT DIFFICULT
RISE FROM A CHAIR: ACTIVITY IS NOT DIFFICULT
REACHING_FOR_SOMETHING_ON_A_HIGH_SHELF: 2
PUTTING_ON_A_SHIRT_THAT_BUTTONS_DOWN_THE_FRONT: 0
WASHING_YOUR_BACK: 0
AT_ITS_WORST?: 2
KNEE_ACTIVITY_OF_DAILY_LIVING_SCORE: 58.57
PLACING_AN_OBJECT_ON_A_HIGH_SHELF: 0
GIVING WAY, BUCKLING OR SHIFTING OF KNEE: THE SYMPTOM AFFECTS MY ACTIVITY MODERATELY
HOW_WOULD_YOU_RATE_THE_OVERALL_FUNCTION_OF_YOUR_KNEE_DURING_YOUR_USUAL_DAILY_ACTIVITIES?: NEARLY NORMAL

## 2024-10-21 NOTE — PROGRESS NOTES
PHYSICAL THERAPY EVALUATION  Type of Visit: Evaluation    {Disappearing TIP  Adult Abuse Screen not completed in this Encounter. Please complete screening!:589420}      {TIP  The Fall Risk Screen has not been completed. Complete the screen!:734016}    Subjective    {Disappearing TIP  Health History pop-up / flowsheet :088527}   Presenting condition or subjective complaint:    Date of onset:   {Disappearing TIP  Date of onset/injury :307876}   Relevant medical history:     Dates & types of surgery:      Prior diagnostic imaging/testing results:       Prior therapy history for the same diagnosis, illness or injury:        Prior Level of Function  Transfers: Independent  Ambulation: Assistive equipment  ADL: Independent      Living Environment  Social support:     Type of home:     Stairs to enter the home:         Ramp:     Stairs inside the home:         Help at home:    Equipment owned:       Employment:      Hobbies/Interests:      Patient goals for therapy:           Objective   Alignment  ***    Integumentary      Gait  Assistive device:  ***  Deviations:  ***      ROM   AROM Right AROM Left  PROM Right PROM Left   Hip flexion       Hip extension       Hip IR       Hip ER       Hip abduction       Knee extension       Knee flexion       Ankle           Strength  ***                    Assessment & Plan   CLINICAL IMPRESSIONS  Medical Diagnosis:    {Disappearing TIP  Medical Dx :948375}  Treatment Diagnosis:   {Disappearing TIP  Treatment Dx :162630}  Impression/Assessment: Patient is a 39 year old female with right hip complaints s/p R NICK 10-16-24.  The following significant findings have been identified: Pain, Decreased ROM/flexibility, Decreased strength, Impaired gait, and Decreased activity tolerance. These impairments interfere with their ability to perform self care tasks, work tasks, recreational activities, household chores, household mobility, and community mobility as compared to previous level  of function.     Clinical Decision Making (Complexity):  Clinical Presentation: {Presentation:479332}  Clinical Presentation Rationale: based on medical and personal factors listed in PT evaluation  Clinical Decision Making (Complexity): {Complexity:796226}    PLAN OF CARE  Treatment Interventions:  Interventions: Neuromuscular Re-education, Therapeutic Activity, Therapeutic Exercise, Self-Care/Home Management    Long Term Goals {Disappearing TIP  Goals :817439}         {Disapparing TIP  Frequency/Duration :135299}  Frequency of Treatment:    Duration of Treatment:      Education Assessment: {Disapparing TIP  Patient Education :510951}       Risks and benefits of evaluation/treatment have been explained.   Patient/Family/caregiver agrees with Plan of Care.     Evaluation Time:   {Disappearing TIP  Eval Minutes :403174}          Signing Clinician: Melba Patterson, PT

## 2024-10-21 NOTE — PROGRESS NOTES
"PHYSICAL THERAPY EVALUATION  Type of Visit: Evaluation       Fall Risk Screen:  Fall screen completed by: PT  Have you fallen 2 or more times in the past year?: No  Have you fallen and had an injury in the past year?: No  Is patient a fall risk?: No    Subjective       Presenting condition or subjective complaint: shin splints and issues with shoulder  Date of onset: 10/16/24 (MD order date)    Relevant medical history:     Dates & types of surgery:      Prior diagnostic imaging/testing results:     none  Prior therapy history for the same diagnosis, illness or injury: No      Prior Level of Function  Transfers: Independent  Ambulation: Independent  ADL: Independent      Living Environment  Social support: With a significant other or spouse   Type of home: House   Stairs to enter the home: Yes       Ramp: No   Stairs inside the home: Yes 12 Is there a railing: Yes     Help at home: None  Equipment owned:       Employment:     FT - desk and sit-stand   Hobbies/Interests:  walking     Patient goals for therapy: walk and bike, and lift weights    Patient had onset of right shoulder pain about 2 years ago after lifting heavy weights with .  She was lifting 20# overhead.  She stopped lifting and recently restarted light exercise up to 5#, gets discomfort up 1+ days following.  Currently pain is anterior shoulder, 0-1/10 since has been avoiding lifting, and feels \"weak\" right shoulder.   Symptoms initially would increase with getting shift on/off, overhead activity, now only with lifting 5#.  Symptoms decrease with rest.  Patient is right-handed.    Patient has second complaint of left>right medial tibial pain starting in March 2024.  She started exercising January 2024, treadmill, bike and some weight lifting.  Symptoms exacerbate with biking or walking 30'  She has tried stretching, massage, new shoes - some temporary help with each. Symptoms decrease with stretching (does gastroc, \"butterfly\" in " "sitting, sitting hamstring, and does isometric/resisted DF standing with foot under couch), icing.  She has history of same pain/\"shin splints\" when running as a teenager.  She has used an OTC shoe insert for years intermittently.       Objective   Integumentary/swelling/edema  Unremarkable shoulder and bilateral Les    Posture  Rounded shoulders      AROM/PROM SHOULDER   Right Left   Flexion WNL WNL   Abduction WNL WNL   Extension WNL WNL   Ext/IR T6 T6   ER WNL WNL   IR     Horizontal add     Horizontal abd       Strength  Left shoulder 5/5   Right shoulder flexion 4/5, abd 4/5, ER 5/5, IR 4+/5, elbow 5/5     Special Tests    Positive impingement test    Lower Extremities   Alignment  Low arch height bilaterally, hypertrophy abductor hallucis left>right      Integumentary/edema  Unremarkable     Gait  Assistive device: none  Deviations:  Prolonged pronation bilaterally, does not fully resupinate before heel strike    Palpation  Tenderness left>right medial tibial ridge and posterior and distal to lateral right malleolus    AROM/PROM   Right Left   Ankle DF-PF 5-0-58 6-0-60   Inversion WNL WNL   Eversion WNL WNL   Great toe ext WNL WNL   Toe extension     Toe flexion       Strength  Bilateral knee flexion and extension 5/5, ankle inversion 4/5 left and 5/5 right, eversion 5/5 bilaterally, DF 5/5 bilaterally, great toe extension and toe flexion 5/5     Assessment & Plan   CLINICAL IMPRESSIONS  Medical Diagnosis: Chronic R shldr pain and B medial tibial stress syndrome    Treatment Diagnosis: R shldr pain and weakness and bilateral shin pain and weakness   Impression/Assessment: Patient is a 39 year old female with right shoulder and left>right shin complaints.  The following significant findings have been identified: Pain, Decreased ROM/flexibility, Decreased strength, Impaired gait, and Decreased activity tolerance. These impairments interfere with their ability to perform recreational activities and community " mobility as compared to previous level of function.     Clinical Decision Making (Complexity):  Clinical Presentation: Stable/Uncomplicated  Clinical Presentation Rationale: based on medical and personal factors listed in PT evaluation  Clinical Decision Making (Complexity): Low complexity    PLAN OF CARE  Treatment Interventions:  Interventions: Neuromuscular Re-education, Therapeutic Activity, Therapeutic Exercise, Self-Care/Home Management    Long Term Goals     PT Goal 1  Goal Identifier: Lifting  Goal Description: Patient will be able to lift      Frequency of Treatment: 1x/week for 4 weeks then 2x/month for 2 visits  Duration of Treatment: 8 weeks      Education Assessment:        Risks and benefits of evaluation/treatment have been explained.   Patient/Family/caregiver agrees with Plan of Care.     Evaluation Time:             Signing Clinician: Melba Patterson, PT

## 2024-11-04 ENCOUNTER — THERAPY VISIT (OUTPATIENT)
Dept: PHYSICAL THERAPY | Facility: CLINIC | Age: 39
End: 2024-11-04
Payer: COMMERCIAL

## 2024-11-04 DIAGNOSIS — M25.511 SHOULDER PAIN, RIGHT: Primary | ICD-10-CM

## 2024-11-04 DIAGNOSIS — M79.605 BILATERAL LEG PAIN: ICD-10-CM

## 2024-11-04 DIAGNOSIS — M79.604 BILATERAL LEG PAIN: ICD-10-CM

## 2024-11-04 PROCEDURE — 97110 THERAPEUTIC EXERCISES: CPT | Mod: GP | Performed by: PHYSICAL THERAPIST

## 2024-11-04 PROCEDURE — 97530 THERAPEUTIC ACTIVITIES: CPT | Mod: GP | Performed by: PHYSICAL THERAPIST

## 2024-11-11 ENCOUNTER — THERAPY VISIT (OUTPATIENT)
Dept: PHYSICAL THERAPY | Facility: CLINIC | Age: 39
End: 2024-11-11
Payer: COMMERCIAL

## 2024-11-11 DIAGNOSIS — M79.605 BILATERAL LEG PAIN: ICD-10-CM

## 2024-11-11 DIAGNOSIS — M25.511 SHOULDER PAIN, RIGHT: Primary | ICD-10-CM

## 2024-11-11 DIAGNOSIS — M79.604 BILATERAL LEG PAIN: ICD-10-CM

## 2024-11-11 PROCEDURE — 97110 THERAPEUTIC EXERCISES: CPT | Mod: GP | Performed by: PHYSICAL THERAPIST

## 2024-12-02 ENCOUNTER — THERAPY VISIT (OUTPATIENT)
Dept: PHYSICAL THERAPY | Facility: CLINIC | Age: 39
End: 2024-12-02
Payer: COMMERCIAL

## 2024-12-02 DIAGNOSIS — M79.605 BILATERAL LEG PAIN: ICD-10-CM

## 2024-12-02 DIAGNOSIS — M79.604 BILATERAL LEG PAIN: ICD-10-CM

## 2024-12-02 DIAGNOSIS — M25.511 SHOULDER PAIN, RIGHT: Primary | ICD-10-CM

## 2024-12-02 PROCEDURE — 97110 THERAPEUTIC EXERCISES: CPT | Mod: GP | Performed by: PHYSICAL THERAPIST

## 2024-12-09 ENCOUNTER — THERAPY VISIT (OUTPATIENT)
Dept: PHYSICAL THERAPY | Facility: CLINIC | Age: 39
End: 2024-12-09
Payer: COMMERCIAL

## 2024-12-09 ENCOUNTER — OFFICE VISIT (OUTPATIENT)
Dept: INTERNAL MEDICINE | Facility: CLINIC | Age: 39
End: 2024-12-09
Payer: COMMERCIAL

## 2024-12-09 VITALS
TEMPERATURE: 98.2 F | SYSTOLIC BLOOD PRESSURE: 110 MMHG | BODY MASS INDEX: 40.46 KG/M2 | OXYGEN SATURATION: 97 % | HEIGHT: 67 IN | HEART RATE: 95 BPM | DIASTOLIC BLOOD PRESSURE: 72 MMHG | WEIGHT: 257.8 LBS

## 2024-12-09 DIAGNOSIS — E78.2 MIXED HYPERLIPIDEMIA: ICD-10-CM

## 2024-12-09 DIAGNOSIS — M79.605 BILATERAL LEG PAIN: ICD-10-CM

## 2024-12-09 DIAGNOSIS — M25.511 CHRONIC RIGHT SHOULDER PAIN: Primary | ICD-10-CM

## 2024-12-09 DIAGNOSIS — G89.29 CHRONIC RIGHT SHOULDER PAIN: Primary | ICD-10-CM

## 2024-12-09 DIAGNOSIS — Z11.4 SCREENING FOR HIV (HUMAN IMMUNODEFICIENCY VIRUS): ICD-10-CM

## 2024-12-09 DIAGNOSIS — M79.604 BILATERAL LEG PAIN: ICD-10-CM

## 2024-12-09 DIAGNOSIS — H61.21 IMPACTED CERUMEN OF RIGHT EAR: ICD-10-CM

## 2024-12-09 DIAGNOSIS — Z11.59 NEED FOR HEPATITIS C SCREENING TEST: ICD-10-CM

## 2024-12-09 DIAGNOSIS — E66.01 MORBID OBESITY (H): ICD-10-CM

## 2024-12-09 DIAGNOSIS — Z00.00 ANNUAL PHYSICAL EXAM: Primary | ICD-10-CM

## 2024-12-09 DIAGNOSIS — Z12.4 CERVICAL CANCER SCREENING: ICD-10-CM

## 2024-12-09 DIAGNOSIS — M65.4 DE QUERVAIN'S DISEASE (TENOSYNOVITIS): ICD-10-CM

## 2024-12-09 LAB
ALBUMIN SERPL BCG-MCNC: 4.3 G/DL (ref 3.5–5.2)
ALP SERPL-CCNC: 65 U/L (ref 40–150)
ALT SERPL W P-5'-P-CCNC: 26 U/L (ref 0–50)
ANION GAP SERPL CALCULATED.3IONS-SCNC: 9 MMOL/L (ref 7–15)
AST SERPL W P-5'-P-CCNC: 32 U/L (ref 0–45)
BILIRUB SERPL-MCNC: 0.3 MG/DL
BUN SERPL-MCNC: 17.3 MG/DL (ref 6–20)
CALCIUM SERPL-MCNC: 9.1 MG/DL (ref 8.8–10.4)
CHLORIDE SERPL-SCNC: 103 MMOL/L (ref 98–107)
CHOLEST SERPL-MCNC: 192 MG/DL
CREAT SERPL-MCNC: 0.88 MG/DL (ref 0.51–0.95)
EGFRCR SERPLBLD CKD-EPI 2021: 86 ML/MIN/1.73M2
FASTING STATUS PATIENT QL REPORTED: YES
FASTING STATUS PATIENT QL REPORTED: YES
GLUCOSE SERPL-MCNC: 89 MG/DL (ref 70–99)
HCO3 SERPL-SCNC: 26 MMOL/L (ref 22–29)
HCV AB SERPL QL IA: NONREACTIVE
HDLC SERPL-MCNC: 61 MG/DL
HIV 1+2 AB+HIV1 P24 AG SERPL QL IA: NONREACTIVE
LDLC SERPL CALC-MCNC: 104 MG/DL
NONHDLC SERPL-MCNC: 131 MG/DL
POTASSIUM SERPL-SCNC: 4.3 MMOL/L (ref 3.4–5.3)
PROT SERPL-MCNC: 7 G/DL (ref 6.4–8.3)
SODIUM SERPL-SCNC: 138 MMOL/L (ref 135–145)
TRIGL SERPL-MCNC: 134 MG/DL

## 2024-12-09 PROCEDURE — 97110 THERAPEUTIC EXERCISES: CPT | Mod: GP | Performed by: PHYSICAL THERAPIST

## 2024-12-09 SDOH — HEALTH STABILITY: PHYSICAL HEALTH: ON AVERAGE, HOW MANY DAYS PER WEEK DO YOU ENGAGE IN MODERATE TO STRENUOUS EXERCISE (LIKE A BRISK WALK)?: 3 DAYS

## 2024-12-09 ASSESSMENT — SOCIAL DETERMINANTS OF HEALTH (SDOH): HOW OFTEN DO YOU GET TOGETHER WITH FRIENDS OR RELATIVES?: ONCE A WEEK

## 2024-12-09 NOTE — PATIENT INSTRUCTIONS
[employer]inn[employer] Association: https://www.frank.org/?gad_source=1&gclid=[email or web address]    Pap updated today.  Results usually take a couple of days.   If normal, repeat in 5 years years.  If abnormal, I will let you know next steps.     General diet recommendations:  - Eat a variety of fresh fruits and vegetables throughout the day (mostly vegetables).  - Increase intake of whole grains and fiber, especially soluble fiber (oats, beans, lentils, chickpeas, oat bran, barley, etc).  - Choose foods low in salt. Aim for 2 grams or less of sodium per day.   - Eat more lean proteins such as chicken, fish, egg whites, tofu, beans, plant-based meat substitutes like Better Burger and Impossible Burger, and low-fat milk products. Limit red meat.  Saturated fat down-regulates the LDL receptors in your liver, which prevents LDL cholesterol from being removed from your body effectively. This raises your LDL cholesterol.  Some saturated fats are OK and appear to not raise cholesterol significantly. These include fermented dairy like cheese and yogurt.  - Increase intake of polyunsaturated fat in the diet. These help to up-regulate the LDL receptors in the liver, which helps your body remove LDL cholesterol more effectively. (https://www.heart.org/en/healthy-living/healthy-eating/eat-smart/fats/polyunsaturated-fats)  - Add tree nuts to your diet (if not allergic). Aim for 45 g/day, which is about 1 handful of nuts.  - Limit or avoid refined carbohydrates like sugar, white bread, white rice, and high fructose corn syrup. If you eat refined carbohydrates and your body does not need it for energy at that time, your body converts the carbohydrates to triglycerides.  - Increase sterols and stanols in your diet, which prevent cholesterol absorption. Sterols occur naturally in soyabeans, corn, squash, etc. Aim for 2g/day stanols and sterols. This can be hard to obtain without taking a supplement,  which is expensive. You can also look for foods fortified with sterols, such as plant sterol margarine, juices, yogurt, and oils.  - Aim for eight 8-ounce glasses (64 ounces) of water per day. Many people find it helpful to carry a water bottle with them so they can keep track of their water intake. Your body also absorbs liquid better when you drink frequently in small amounts, rather than large amounts at one time. It is also easier to remember to drink water if you have a water bottle with you wherever you go.  - Avoid sugary drinks such as pop and juice.  - Limit caffeine to 2-3 cups per day.  - Avoid energy drinks and energy supplements.    Consider the Anaergiao Diet: https://Scopis.ca/darrell/uploads/2020/11/PortMaxMilhaso_Diet_Scroll_editable_eng.pdf    General exercise recommendations:  Aim for 30 minutes of walking or other moderate exercise 5 times per week OR 40 minutes 3 to 4 times per week. Try not to go more than 48 hours without exercise.  **You should be exercising at a strenuous enough rate that you can still hold a conversation but could not sing a song.

## 2024-12-09 NOTE — PROGRESS NOTES
Preventive Care Visit  [facility]  ARLYN Isabel CNP, Internal Medicine  Dec 9, 2024    Assessment & Plan     (Z00.00) Annual physical exam  (primary encounter diagnosis)  Comment: Past medical history, surgical history, family history, social history, sexual history, medications, allergies, and immunizations reviewed and updated as appropriate.   Care gaps addressed, including routine screenings.  Annual lab work ordered.  Physical exam unremarkable, except as noted.  Diet/exercise recommendations discussed.  Plan: Comprehensive metabolic panel    (M65.4) De Quervain's disease (tenosynovitis)  Comment: Wrist brace provided; patient to wear brace when she is having more significant pain, and at night.  Rest, adjust workstation to ensure ergonomic set up, NSAIDs, ice as needed.  Follow-up if symptoms persist or worsen.  Would plan to refer to hand specialist at that time.  Plan: Wrist/Arm/Hand Bracing Supplies Order Wrist         Brace; Right; with thumb spica    (H61.21) Impacted cerumen of right ear  Comment: Ear lavage today, per PIPPA [place].  Plan: ME REMOVAL IMPACTED CERUMEN IRRIGATION/LVG         UNILAT    (E78.2) Mixed hyperlipidemia  Comment: Labs ordered for monitoring.  Plan: Lipid panel reflex to direct LDL Fasting    (E66.01) Morbid obesity (H)  Comment:  Discussed diet/exercise recommendations.  Counseled that weight loss would help comorbid conditions including hyperlipidemia    (Z12.4) Cervical cancer screening  Comment: Pap updated today.  This is her first Pap.  She had many questions about HPV and Pap testing, which were answered to her satisfaction.  She is interested in HPV vaccination and will contact her insurance to see if this is covered.  Plan: HPV and Gynecologic Cytology Panel -         Recommended Age 30 - 65 Years    (Z11.4) Screening for HIV (human immunodeficiency virus)  Comment: Labs ordered for screening.  Plan: HIV Antigen Antibody  "Combo    (Z11.59) Need for hepatitis C screening test  Comment: Labs ordered for screening.  Plan: Hepatitis C Screen Reflex to HCV RNA Quant and         Genotype     Patient has been advised of split billing requirements and indicates understanding: Yes    BMI  Estimated body mass index is 40.68 kg/m  as calculated from the following:    Height as of this encounter: 1.695 m (5' 6.75\").    Weight as of this encounter: 116.9 kg (257 lb 12.8 oz).   Weight management plan: Discussed healthy diet and exercise guidelines    Counseling  Appropriate preventive services were addressed with this patient via screening, questionnaire, or discussion as appropriate for fall prevention, nutrition, physical activity, Tobacco-use cessation, social engagement, weight loss and cognition.  Checklist reviewing preventive services available has been given to the patient.  Reviewed patient's diet, addressing concerns and/or questions.   She is at risk for lack of exercise and has been provided with information to increase physical activity for the benefit of her well-being.     See Patient Instructions        Aniyah Matthews is a 39 year old, presenting for the following:  Physical (fasting)    Here for annual exam.    The following concerns were also addressed today:  Cerumen impaction right ear. Would like ear lavage today.  Pain along right thumb and radiating up radial arm. 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 Over the weekend, pain was so severe she had a hard time flexing her thumb at all. Pain and decreased ROM are adversely impacting function. She is right-handed.      Health Maintenance:  Vaccines due: influenza and COVID-19; declined  Mammogram: Patient under age 40 (routine screening not recommended). Family history of breast cancer?: Yes; mother  from breast cancer (dx in her early 50s)    Pap: Never had. Family history of uterine cancer: no; ovarian cancer: no.  Colon cancer screening: N/A. Family " history of colon cancer?: no  DEXA: N/A.  Screening chest CT: N/A. Family history of lung cancer?: no  AAA screen: N/A  Vision: No concerns.   Dental: No concerns.   Hearing: History of cerumen impaction. Tinnitus left ear; progressive.    Sexual Health History:  Partners:Male  Contraception: none; history of infertility  Menstrual history: regular and LMP: 11/22/24 12/9/2024   General Health   How would you rate your overall physical health? Good   Feel stress (tense, anxious, or unable to sleep) Only a little      (!) STRESS CONCERN      12/9/2024   Nutrition   Three or more servings of calcium each day? Yes   Diet: Regular (no restrictions)   How many servings of fruit and vegetables per day? (!) 2-3   How many sweetened beverages each day? 0-1            12/9/2024   Exercise   Days per week of moderate/strenous exercise 3 days            12/9/2024   Social Factors   Frequency of gathering with friends or relatives Once a week   Worry food won't last until get money to buy more No   Food not last or not have enough money for food? No   Do you have housing? (Housing is defined as stable permanent housing and does not include staying ouside in a car, in a tent, in an abandoned building, in an overnight shelter, or couch-surfing.) Yes   Are you worried about losing your housing? No   Lack of transportation? No   Unable to get utilities (heat,electricity)? No            12/9/2024   Dental   Dentist two times every year? Yes            12/9/2024   TB Screening   Were you born outside of the [place]? No      Today's PHQ-2 Score:       3/7/2024     9:08 AM   PHQ-2 ( 1999 Pfizer)   Q1: Little interest or pleasure in doing things 0    Q2: Feeling down, depressed or hopeless 0    PHQ-2 Score 0   Q1: Little interest or pleasure in doing things Not at all   Q2: Feeling down, depressed or hopeless Not at all   PHQ-2 Score 0       Patient-reported         12/9/2024   Substance Use   Alcohol more than 3/day or more than  "7/wk No   Do you use any other substances recreationally? No       Social History     Tobacco Use    Smoking status: Never    Smokeless tobacco: Never   Vaping Use    Vaping status: Never Used   Substance Use Topics    Alcohol use: Yes     Comment: 1-2 beer or hard alcohol    Drug use: Never         5/24/2024   LAST FHS-7 RESULTS   1st degree relative breast or ovarian cancer Yes   Any relative bilateral breast cancer No   Any male have breast cancer No   Any ONE woman have BOTH breast AND ovarian cancer No   Any woman with breast cancer before 50yrs No   2 or more relatives with breast AND/OR ovarian cancer No   2 or more relatives with breast AND/OR bowel cancer No    Declines referral for genetic testing.        12/9/2024   One time HIV Screening   Previous HIV test? No          12/9/2024   STI Screening   New sexual partner(s) since last STI/HIV test? No            12/9/2024   Contraception/Family Planning   Questions about contraception or family planning No        Reviewed and updated as needed this visit by Provider   Tobacco     Med Hx  Surg Hx  Fam Hx  Soc Hx Sexual Activity              Review of Systems  Constitutional, HEENT, cardiovascular, pulmonary, GI, , musculoskeletal, neuro, skin, endocrine and psych systems are negative, except as otherwise noted.     Objective    Exam  /72   Pulse 95   Temp 98.2  F (36.8  C) (Temporal)   Ht 1.695 m (5' 6.75\")   Wt 116.9 kg (257 lb 12.8 oz)   LMP 11/22/2024 (Exact Date)   SpO2 97%   BMI 40.68 kg/m     Estimated body mass index is 40.68 kg/m  as calculated from the following:    Height as of this encounter: 1.695 m (5' 6.75\").    Weight as of this encounter: 116.9 kg (257 lb 12.8 oz).    Physical Exam  Vitals and nursing note reviewed.   Constitutional:       Appearance: Normal appearance. She is obese.   HENT:      Head: Normocephalic and atraumatic.      Right Ear: Tympanic membrane, ear canal and external ear normal.      Left Ear: Tympanic " membrane, ear canal and external ear normal.      Nose: Nose normal.      Mouth/Throat:      Pharynx: Oropharynx is clear.   Eyes:      Extraocular Movements: Extraocular movements intact.      Conjunctiva/sclera: Conjunctivae normal.      Pupils: Pupils are equal, round, and reactive to light.   Neck:      Vascular: No carotid bruit.   Cardiovascular:      Rate and Rhythm: Normal rate and regular rhythm.      Pulses: Normal pulses.      Heart sounds: Normal heart sounds. No murmur heard.     No friction rub. No gallop.   Pulmonary:      Effort: Pulmonary effort is normal. No respiratory distress.      Breath sounds: Normal breath sounds. No wheezing, rhonchi or rales.   Chest:      Comments: DECLINED breast exam; had normal mammogram 5/2024  Abdominal:      General: Abdomen is flat. Bowel sounds are decreased. There is no distension.      Palpations: Abdomen is soft. There is no mass.      Tenderness: There is no abdominal tenderness. There is no guarding.      Hernia: No hernia is present.   Genitourinary:     Exam position: Lithotomy position.      Pubic Area: Rash (mild erythema (asymptomatic)) present.      Labia:         Right: No rash, tenderness or lesion.         Left: No rash, tenderness or lesion.       Comments: Moderate amount of white creamy discharge without odor  Musculoskeletal:         General: No swelling.      Right hand: Tenderness present. No swelling or deformity. Decreased range of motion. Decreased strength (). Normal sensation. Normal capillary refill.      Cervical back: Normal range of motion and neck supple.      Comments: +Finkelstein test right thumb   Skin:     General: Skin is warm and dry.   Neurological:      General: No focal deficit present.      Mental Status: She is alert and oriented to person, place, and time.   Psychiatric:         Mood and Affect: Mood normal.         Behavior: Behavior normal.         Thought Content: Thought content normal.         Judgment: Judgment  normal.       Signed Electronically by: ARLYN Isabel CNP

## 2024-12-10 LAB
HPV HR 12 DNA CVX QL NAA+PROBE: NEGATIVE
HPV16 DNA CVX QL NAA+PROBE: NEGATIVE
HPV18 DNA CVX QL NAA+PROBE: NEGATIVE
HUMAN PAPILLOMA VIRUS FINAL DIAGNOSIS: NORMAL

## 2024-12-12 LAB
BKR AP ASSOCIATED HPV REPORT: NORMAL
BKR LAB AP GYN ADEQUACY: NORMAL
BKR LAB AP GYN INTERPRETATION: NORMAL
BKR LAB AP LMP: NORMAL
BKR LAB AP PREVIOUS ABNORMAL: NORMAL
PATH REPORT.COMMENTS IMP SPEC: NORMAL
PATH REPORT.COMMENTS IMP SPEC: NORMAL
PATH REPORT.RELEVANT HX SPEC: NORMAL

## 2024-12-16 ENCOUNTER — THERAPY VISIT (OUTPATIENT)
Dept: PHYSICAL THERAPY | Facility: CLINIC | Age: 39
End: 2024-12-16
Payer: COMMERCIAL

## 2024-12-16 DIAGNOSIS — M79.605 BILATERAL LEG PAIN: ICD-10-CM

## 2024-12-16 DIAGNOSIS — G89.29 CHRONIC RIGHT SHOULDER PAIN: Primary | ICD-10-CM

## 2024-12-16 DIAGNOSIS — M79.604 BILATERAL LEG PAIN: ICD-10-CM

## 2024-12-16 DIAGNOSIS — M25.511 CHRONIC RIGHT SHOULDER PAIN: Primary | ICD-10-CM

## 2024-12-16 PROCEDURE — 97110 THERAPEUTIC EXERCISES: CPT | Mod: GP | Performed by: PHYSICAL THERAPIST

## 2024-12-16 NOTE — PROGRESS NOTES
"    PLAN  Continue therapy per current plan of care.   12/16/24 0500   Appointment Info   Signing clinician's name / credentials Melba Patterson PT   Total/Authorized Visits 6   Visits Used 6   Medical Diagnosis Chronic R shldr pain and B medial tibial stress syndrome   PT Tx Diagnosis R shldr pain and weakness and bilateral shin pain and weakness   Progress Note/Certification   Onset of illness/injury or Date of Surgery 10/16/24  (MD order date)   Therapy Frequency 1-2x/month for 2 months   Predicted Duration 8 weeks   Progress Note Due Date 02/10/25   GOALS   PT Goals 2   PT Goal 1   Goal Identifier Lifting   Goal Description Patient will be able to lift 8# overhead without pain   Rationale to maximize safety and independence with performance of ADLs and functional tasks;to maximize safety and independence within the home  (and strength program)   Goal Progress not met - minimal sxs with 3# - extend goal timeframe   Target Date 02/10/25   PT Goal 2   Goal Identifier Ambulation   Goal Description Patient will be able to walk 45' without pain   Rationale to maximize safety and independence with performance of ADLs and functional tasks;to maximize safety and independence within the community   Goal Progress 30', not met - cont/extend   Target Date 02/10/25   Subjective Report   Subjective Report Got orthotics today - feel comfortable.  Was instructed to start with 30', then add 30' each day then use for walks in a few days.  No change left shin, continued symptoms after ~30' walk, unable to walk 2 days in a row due to symptoms.  No pain right shin, but does not feel \"normal\".  Pain anterior right shoulder with/after wand extension, does not last.  Feels ROM has improved in right shoulder, no pain with any motion except for horizontal adduction.   Objective Measure 1   Objective Measure AROM WNL right shoulder flexion, ext, abduction and ext/IR with minimal sxs end range ext/IR, min loss hor adduction with pain/\"pinch\" " "anterior right shoulder   Details MMT left   Objective Measure 2   Details MMT bilateral ankle DF, inv, ev 5/5 no sxs   Objective Measure AROM DF-PF right 8-0-58, left 10-0-53 degrees   Treatment Interventions (PT)   Interventions Therapeutic Procedure/Exercise;Therapeutic Activity   Therapeutic Procedure/Exercise   Therapeutic Procedures: strength, endurance, ROM, flexibility minutes (85268) 40   PTRx Ther Proc 1 Shoulder Towel Stretch Internal Rotation   PTRx Ther Proc 1 - Details x10 - inc ROM hor add; x 20 incr hor add ROM/decrease pain, abolish pain with ext/IR 2)trial rep hor add with pt OP - incr right ant shldr pain.  To change HEP from rep ext with wand OP to ext/IR with belt/towel OP 4-5x/day 10 reps.  Stop if getting worse overall   PTRx Ther Proc 2 Shoulder Extension in Standing - AG   PTRx Ther Proc 2 - Details No time - cont HEP qod avoid sxs add light weight as able up to 1# maximum   PTRx Ther Proc 3 Anterior Ankle Stretch   PTRx Ther Proc 3 - Details 3x20\" unable to get full ROM.  Add to HEP 20-30\"x2 1-2x/day avoid sxs    PTRx Ther Proc 4 Theraband Ankle Dorsiflexion   PTRx Ther Proc 4 - Details red tband 2x10 decreased tension to avoid sxs;  add to HEP qod up to 2x15 avoid sxs   PTRx Ther Proc 5 Theraband Ankle Inversion   PTRx Ther Proc 5 - Details x20 with red theraband - add to HEP qod up to 2x15 avoid sxs    PTRx Ther Proc 6 Foot Doming   PTRx Ther Proc 6 - Details verbal review - cont HEP 2x/day 10-15 reps    PTRx Ther Proc 7 Clamshell Feet Apart   PTRx Ther Proc 7 - Details HEP - cont    PTRx Ther Proc 8 Knee Bends   PTRx Ther Proc 8 - Details x10 at sink green band at knees x 10 with hip abd activation shallow to avoid knee sxs - cont HEP qod    PTRx Ther Proc 9 Side Stepping With Theraband   PTRx Ther Proc 9 - Details green band near ankles review 1x25' each direction - cont HEP qod   PTRx Ther Proc 10 Hip AROM Standing Abduction   PTRx Ther Proc 10 - Details review - cont HEP qod 2x15 each " "side    PTRx Ther Proc 11 Donkey Kick   PTRx Ther Proc 11 - Details No time - cont HEP qod    PTRx Ther Proc 12 Donkey Kick   PTRx Ther Proc 12 - Details x10 each side - add to HEP qod up to 2x15 each side.  Used bolster to \"push\" with foot to get correct movement    Skilled Intervention Instruction in exercise to increase ROM and strength to decrease pain and increase function   Therapeutic Activity   Ther Act 1 Review POC and rationale for HEP, answered pt questions   Skilled Intervention Education and exercise to decrease pain and increase function   Plan   Updates to plan of care Progress with HEP, recheck in 2-3 weeks.   Total Session Time   Timed Code Treatment Minutes 40   Total Treatment Time (sum of timed and untimed services) 40         Beginning/End Dates of Progress Note Reporting Period:    to 12/16/2024    Referring Provider:  Carolyn Preston    "

## 2024-12-25 ENCOUNTER — OFFICE VISIT (OUTPATIENT)
Dept: URGENT CARE | Facility: URGENT CARE | Age: 39
End: 2024-12-25
Payer: COMMERCIAL

## 2024-12-25 ENCOUNTER — ANCILLARY PROCEDURE (OUTPATIENT)
Dept: GENERAL RADIOLOGY | Facility: CLINIC | Age: 39
End: 2024-12-25
Attending: FAMILY MEDICINE
Payer: COMMERCIAL

## 2024-12-25 VITALS
WEIGHT: 257 LBS | HEART RATE: 97 BPM | TEMPERATURE: 99.3 F | DIASTOLIC BLOOD PRESSURE: 86 MMHG | RESPIRATION RATE: 20 BRPM | SYSTOLIC BLOOD PRESSURE: 126 MMHG | OXYGEN SATURATION: 96 % | BODY MASS INDEX: 40.55 KG/M2

## 2024-12-25 DIAGNOSIS — R50.9 FEVER, UNSPECIFIED FEVER CAUSE: Primary | ICD-10-CM

## 2024-12-25 DIAGNOSIS — R05.1 ACUTE COUGH: ICD-10-CM

## 2024-12-25 DIAGNOSIS — R07.0 THROAT PAIN: ICD-10-CM

## 2024-12-25 LAB
DEPRECATED S PYO AG THROAT QL EIA: NEGATIVE
FLUAV AG SPEC QL IA: NEGATIVE
FLUBV AG SPEC QL IA: NEGATIVE
S PYO DNA THROAT QL NAA+PROBE: NOT DETECTED
SARS-COV-2 RNA RESP QL NAA+PROBE: POSITIVE

## 2024-12-25 PROCEDURE — 71046 X-RAY EXAM CHEST 2 VIEWS: CPT | Mod: TC | Performed by: RADIOLOGY

## 2024-12-25 PROCEDURE — 87651 STREP A DNA AMP PROBE: CPT | Performed by: FAMILY MEDICINE

## 2024-12-25 PROCEDURE — 87804 INFLUENZA ASSAY W/OPTIC: CPT | Performed by: FAMILY MEDICINE

## 2024-12-25 PROCEDURE — 87635 SARS-COV-2 COVID-19 AMP PRB: CPT | Performed by: FAMILY MEDICINE

## 2024-12-25 PROCEDURE — 99214 OFFICE O/P EST MOD 30 MIN: CPT | Performed by: FAMILY MEDICINE

## 2024-12-25 NOTE — PROGRESS NOTES
Assessment & Plan     Throat pain  - Streptococcus A Rapid Screen w/Reflex to PCR - Clinic Collect  - COVID-19 Virus (Coronavirus) by PCR Nasopharyngeal  - Influenza A & B Antigen - Clinic Collect  - Group A Streptococcus PCR Throat Swab    Acute cough  - Streptococcus A Rapid Screen w/Reflex to PCR - Clinic Collect  - COVID-19 Virus (Coronavirus) by PCR Nasopharyngeal  - Influenza A & B Antigen - Clinic Collect  - XR Chest 2 Views; Future  - Group A Streptococcus PCR Throat Swab    Fever, unspecified fever cause  Xray clear to my read   - Streptococcus A Rapid Screen w/Reflex to PCR - Clinic Collect  - COVID-19 Virus (Coronavirus) by PCR Nasopharyngeal  - Influenza A & B Antigen - Clinic Collect  - Group A Streptococcus PCR Throat Swab    Viral URI   Unclear virus   Conservative therapy       No follow-ups on file.    Aniyah Matthews is a 39 year old, presenting for the following health issues:  Urgent Care (Pt states  she lost her voice today and has had sore throat for 2 days. Diarrhea 3 days,cough 3 days.She has had temp of 100 for 3 days.)    HPI     Chest congestion   Gettingworse   Cough  Sore throat   Diarrhea   100 tmax      has cough                 Objective    /86   Pulse 97   Temp 99.3  F (37.4  C) (Tympanic)   Resp 20   Wt 116.6 kg (257 lb)   LMP 11/22/2024 (Exact Date)   SpO2 96%   BMI 40.55 kg/m    Body mass index is 40.55 kg/m .  Physical Exam  Constitutional:       General: She is not in acute distress.  HENT:      Right Ear: Tympanic membrane, ear canal and external ear normal.      Left Ear: Tympanic membrane, ear canal and external ear normal.      Nose: Nose normal.      Mouth/Throat:      Mouth: Mucous membranes are moist.      Pharynx: No oropharyngeal exudate.   Eyes:      General: No scleral icterus.     Conjunctiva/sclera: Conjunctivae normal.   Neck:      Comments: Possible left thyroid nodule   Cardiovascular:      Rate and Rhythm: Normal rate and regular rhythm.       Heart sounds: Normal heart sounds.   Pulmonary:      Effort: No respiratory distress.      Breath sounds: Normal breath sounds.   Lymphadenopathy:      Cervical: No cervical adenopathy.   Neurological:      Mental Status: She is alert.   Psychiatric:         Mood and Affect: Mood normal.         Behavior: Behavior normal.                Signed Electronically by: Sinan Man DO

## 2024-12-26 ENCOUNTER — TELEPHONE (OUTPATIENT)
Dept: NURSING | Facility: CLINIC | Age: 39
End: 2024-12-26
Payer: COMMERCIAL

## 2024-12-26 NOTE — TELEPHONE ENCOUNTER
Patient classified as COVID treatment eligible by Epic high risk algorithm:  Yes    Coronavirus (COVID-19) Notification    Reason for call  Notify of POSITIVE COVID-19 lab result, assess symptoms,  review [facility] recommendations    Lab Result   Lab test for 2019-nCoV rRt-PCR or SARS-COV-2 PCR  Oropharyngeal AND/OR nasopharyngeal swabs were POSITIVE for 2019-nCoV RNA [OR] SARS-COV-2 RNA (COVID-19) RNA     We have been unable to reach patient by phone at this time to notify of their Positive COVID-19 result.    Left voicemail message requesting a call back to [phone] [facility] for results.        A Positive COVID-19 letter will be sent via [employer] or the mail.    Soraya Orosco

## 2025-04-14 ENCOUNTER — THERAPY VISIT (OUTPATIENT)
Dept: PHYSICAL THERAPY | Facility: CLINIC | Age: 40
End: 2025-04-14
Payer: COMMERCIAL

## 2025-04-14 DIAGNOSIS — M25.511 CHRONIC RIGHT SHOULDER PAIN: Primary | ICD-10-CM

## 2025-04-14 DIAGNOSIS — M79.605 BILATERAL LEG PAIN: ICD-10-CM

## 2025-04-14 DIAGNOSIS — M79.604 BILATERAL LEG PAIN: ICD-10-CM

## 2025-04-14 DIAGNOSIS — G89.29 CHRONIC RIGHT SHOULDER PAIN: Primary | ICD-10-CM

## 2025-04-14 PROCEDURE — 97110 THERAPEUTIC EXERCISES: CPT | Mod: GP | Performed by: PHYSICAL THERAPIST

## 2025-04-14 PROCEDURE — 97530 THERAPEUTIC ACTIVITIES: CPT | Mod: GP | Performed by: PHYSICAL THERAPIST

## 2025-04-14 NOTE — PROGRESS NOTES
"    PLAN  Continue therapy per current plan of care.   04/14/25 0500   Appointment Info   Signing clinician's name / credentials Melba Patterson PT   Total/Authorized Visits 10 per PN   Visits Used 7   Medical Diagnosis Chronic R shldr pain and B medial tibial stress syndrome   PT Tx Diagnosis R shldr pain and weakness and bilateral shin pain and weakness   Progress Note/Certification   Onset of illness/injury or Date of Surgery 10/16/24  (MD order date)   Therapy Frequency 1-2x/month for 2 months   Predicted Duration 8 weeks   Progress Note Due Date 02/10/25   GOALS   PT Goals 2   PT Goal 1   Goal Identifier Lifting   Goal Description Patient will be able to lift 8# overhead without pain   Rationale to maximize safety and independence with performance of ADLs and functional tasks;to maximize safety and independence within the home  (and strength program)   Goal Progress has not been in for 4 months; restart and extend   Target Date 05/14/25   PT Goal 2   Goal Identifier Ambulation   Goal Description Patient will be able to walk 45' without pain   Rationale to maximize safety and independence with performance of ADLs and functional tasks;to maximize safety and independence within the community   Goal Progress has not been in for 4 months; restart and extend   Target Date 05/14/25   Subjective Report   Subjective Report Patient returns after 4 month absence, due to her and her 's illness.  Then restarted exercises and has been doing ~ 8 weeks.  Shin discomfort has improved since initial evaluation, but is plateaued, intermittent 0-3/10 left>right.  Still cannot walk 2 days in a row or gets increase bilateral shin pain.  Walks ~25' every other day, but patient goal is 1-2 daily walks.  Wearing orthotics consistently for several months, feels took right foot longer to \"adjust\".  Overall intensity of symptoms are less.  Symptoms 0-3/10 left>right.  Additional history:  chronic intermittent LBP<right buttock/lateral " "hip and thigh pain with similar symtpoms left side for at least 8 years, PT 1x/month for adjustments for the last 5 years gives temporary relief.  Right shoulder:  no change, continued intermittent pain anterior right shoulder, worse with lifting ie. kettlebell and shoulder continues to feel \"weak\".  Further history, has chronic \"years\" history of intermittent pain medial to right scapula as well.   Objective Measures   Objective Measures Objective Measure 3   Objective Measure 1   Objective Measure AROM WNL right shoulder flexion, ext, abduction WNL, ext/IR WNL with discomfort end range, min loss horizontal adduction with discomfort   Details MMT left shoulder flexion and abduction 4+/5 without discomfort   Objective Measure 2   Objective Measure AROM DF-PF   Details MMT bilateral ankle   Objective Measure 3   Objective Measure Lumbar:  AROM flexion, extension and left SG WNL, right SG min loss with \"tightness\".  Negative bilateral slump test.   Details Cervical AROM left rotation WNL, RR min loss, ext 75%, flexion WNL   Treatment Interventions (PT)   Interventions Therapeutic Procedure/Exercise;Therapeutic Activity   Therapeutic Procedure/Exercise   Therapeutic Procedures: strength, endurance, ROM, flexibility minutes (91135) 30   Ther Proc 1 discomfort right LB/buttock to right knee and left medial lower leg 1)correction sitting posture with lumbar roll - \"feels good\", no change symptoms 2)prone - 0/10 3)REIL - no effect.  Gradual return of right buttock/hip symptoms with return to standing.  Patient to do trial REIL 10 reps every 2-3 hours, minimum 6x/day, stop if getting worse overall   PTRx Ther Proc 2 Shoulder Extension in Standing - AG   PTRx Ther Proc 2 - Details review, has not been doing - cont to hold   PTRx Ther Proc 3 Anterior Ankle Stretch   PTRx Ther Proc 3 - Details hold while assessing effect of REIL   PTRx Ther Proc 4 Theraband Ankle Dorsiflexion   PTRx Ther Proc 4 - Details verbal review - cont " HEP   PTRx Ther Proc 5 Theraband Ankle Inversion   PTRx Ther Proc 5 - Details verbal review, cont HEP   PTRx Ther Proc 6 Foot Doming   PTRx Ther Proc 6 - Details verbal review - cont HEP 2x/day 10-15 reps    PTRx Ther Proc 7 Clamshell Feet Apart   PTRx Ther Proc 7 - Details HEP - cont    PTRx Ther Proc 8 Knee Bends   PTRx Ther Proc 8 - Details no time - HEP   PTRx Ther Proc 9 Side Stepping With Theraband   PTRx Ther Proc 9 - Details no time - HEP   PTRx Ther Proc 10 Hip AROM Standing Abduction   PTRx Ther Proc 10 - Details no time, HEP   PTRx Ther Proc 11 Donkey Kick   PTRx Ther Proc 11 - Details No time - cont HEP qod    Skilled Intervention Instruction in exercise to increase ROM and strength to decrease pain and increase function   Ther Proc 1 - Details Cervical:  rep retraction with pt OP, increase right cervical rotation to WNL with decr discomfort, increase cervical extension AROM, decrease discomfort with right shoulder hor adduction and ext/IR.  To do trial retraction with pt OP 10 reps every 2-3 hours   Therapeutic Activity   Therapeutic Activities: dynamic activities to improve functional performance minutes (53187) 10   Ther Act 1 Review POC and rationale for HEP, used TYOB and TYON book graphics for education on local vs referred symptoms and trial cervical and lumbar repeated movements to assess effect on symptoms; answered pt questions   Ther Act 1 - Details Instructed in proper sititng posture and use of lumbar roll.  Pt purchased.  Encouraged interrupting sitting position at least every 20-30' briefly   Skilled Intervention Education and exercise to decrease pain and increase function   Plan   Updates to plan of care Patient will give brief MyChart update in 3-4 days, will recheck in clinic in 1-2 weeks.   Total Session Time   Timed Code Treatment Minutes 40   Total Treatment Time (sum of timed and untimed services) 40         Beginning/End Dates of Progress Note Reporting Period:    to  04/14/2025    Referring Provider:  Carolyn Preston

## 2025-04-21 ENCOUNTER — THERAPY VISIT (OUTPATIENT)
Dept: PHYSICAL THERAPY | Facility: CLINIC | Age: 40
End: 2025-04-21
Payer: COMMERCIAL

## 2025-04-21 DIAGNOSIS — G89.29 CHRONIC RIGHT SHOULDER PAIN: Primary | ICD-10-CM

## 2025-04-21 DIAGNOSIS — M79.605 BILATERAL LEG PAIN: ICD-10-CM

## 2025-04-21 DIAGNOSIS — M79.604 BILATERAL LEG PAIN: ICD-10-CM

## 2025-04-21 DIAGNOSIS — M25.511 CHRONIC RIGHT SHOULDER PAIN: Primary | ICD-10-CM

## 2025-04-21 PROCEDURE — 97110 THERAPEUTIC EXERCISES: CPT | Mod: GP | Performed by: PHYSICAL THERAPIST

## 2025-06-16 ENCOUNTER — THERAPY VISIT (OUTPATIENT)
Dept: PHYSICAL THERAPY | Facility: CLINIC | Age: 40
End: 2025-06-16
Payer: COMMERCIAL

## 2025-06-16 DIAGNOSIS — M79.605 BILATERAL LEG PAIN: ICD-10-CM

## 2025-06-16 DIAGNOSIS — M25.511 CHRONIC RIGHT SHOULDER PAIN: Primary | ICD-10-CM

## 2025-06-16 DIAGNOSIS — G89.29 CHRONIC RIGHT SHOULDER PAIN: Primary | ICD-10-CM

## 2025-06-16 DIAGNOSIS — M79.604 BILATERAL LEG PAIN: ICD-10-CM

## 2025-06-16 PROCEDURE — 97530 THERAPEUTIC ACTIVITIES: CPT | Mod: GP | Performed by: PHYSICAL THERAPIST

## 2025-06-16 PROCEDURE — 97110 THERAPEUTIC EXERCISES: CPT | Mod: GP | Performed by: PHYSICAL THERAPIST

## 2025-06-16 NOTE — PROGRESS NOTES
ASSESSMENT: Patient has not made expected progress due to interrupted treatment attendance.     PLAN  Continue therapy per current plan of care.  Will plan to see her 1x/week for 4 weeks and if no improvement, return to MD for further evaluation.    Beginning/End Dates of Progress Note Reporting Period:    to 06/16/2025    Referring Provider:  Carolyn Preston       06/16/25 0500   Appointment Info   Signing clinician's name / credentials Seda Bullock, PT   Total/Authorized Visits 10 per PN   Visits Used 9   Medical Diagnosis Chronic R shldr pain and B medial tibial stress syndrome   PT Tx Diagnosis R shldr pain and weakness and bilateral shin pain and weakness   Progress Note/Certification   Onset of illness/injury or Date of Surgery 10/16/24  (MD order date)   Therapy Frequency 1-2x/month for 2 months   Predicted Duration 8 weeks   Progress Note Due Date 02/10/25   GOALS   PT Goals 2   PT Goal 1   Goal Identifier Lifting   Goal Description Patient will be able to lift 8# overhead without pain   Rationale to maximize safety and independence with performance of ADLs and functional tasks;to maximize safety and independence within the home  (and strength program)   Goal Progress Has not been able to progress past the weight of her cell phone with strengthening exercises.   Target Date 07/28/25   PT Goal 2   Goal Identifier Ambulation   Goal Description Patient will be able to walk 45' without pain   Rationale to maximize safety and independence with performance of ADLs and functional tasks;to maximize safety and independence within the community   Goal Progress Can walk 30 minutes with 2/10 pain in B shins. Pain increases to 5/10 with walking >30 minutes.   Target Date 07/28/25   Subjective Report   Subjective Report Returns to PT after 2 month absence. Had to miss visit in May d/t migraine. Overall no change to R shoulder. She has no pain with her ADL's, only if she tries lifting weights or when she added  "her cell phone for weight with her cuff strengthening. Can't do the press ups d/t R shoulder pain. Seeing another provider for her low back pain. She has mild, 0-2/10 B anterior shin pain with walking 30 minutes every other day. The orthotics did not help.   Objective Measures   Objective Measures Objective Measure 3   Objective Measure 1   Objective Measure Shoulder AROM and MMT   Details WNL bilaterally; 5/5 bilaterally   Objective Measure 2   Objective Measure CAROM: flex=100%, ext=66%, RR=90% (R neck pain), MB=639%, GMQ=734%   Details LAROM flexion, extension and left SG WNL, right SG min loss with \"tightness\"   Objective Measure 3   Objective Measure B ankle AROM: wnl   Details B ankle MMT DF and PF=5/5   Treatment Interventions (PT)   Interventions Therapeutic Procedure/Exercise;Therapeutic Activity   Therapeutic Procedure/Exercise   Therapeutic Procedures: strength, endurance, ROM, flexibility minutes (08157) 28   PTRx Ther Proc 1 Cervical Retraction with Overpressure   PTRx Ther Proc 1 - Details 2 sets of 10--corrected form cues for end range pt to resume chin tucks 5-6x/day   PTRx Ther Proc 2 Prone Press Ups   PTRx Ther Proc 2 - Details pt states it gave her R shoulder pain so she had to stop changed to MAT   PTRx Ther Proc 3 Standing Extension   PTRx Ther Proc 3 - Details x10 5x/day stop if worse   PTRx Ther Proc 4 Theraband Ankle Dorsiflexion   PTRx Ther Proc 4 - Details Cont qod up to 2x15 avoid sxs   PTRx Ther Proc 5 Theraband Ankle Inversion   PTRx Ther Proc 5 - Details cont qod up to 2x15 avoid sxs    PTRx Ther Proc 6 Foot Doming   PTRx Ther Proc 6 - Details cont HEP 2x/day 10-15 reps    PTRx Ther Proc 7 Clamshell Feet Apart   PTRx Ther Proc 7 - Details HEP - cont    PTRx Ther Proc 8 Knee Bends   PTRx Ther Proc 8 - Details  cont HEP qod    PTRx Ther Proc 9 Side Stepping With Theraband   PTRx Ther Proc 9 - Details  cont HEP qod   PTRx Ther Proc 10 Hip AROM Standing Abduction   PTRx Ther Proc 10 - " Details  cont HEP qod 2x15 each side    PTRx Ther Proc 11 Donkey Kick   PTRx Ther Proc 11 - Details  cont HEP qod    Skilled Intervention Instruction in exercise to increase ROM and strength to decrease pain and increase function   Therapeutic Activity   Therapeutic Activities: dynamic activities to improve functional performance minutes (48558) 12   Ther Act 1 Again: Review POC and rationale for HEP, testing local vs referred symptoms, answered pt questions   PTRx Ther Act 1 Posture Correction with Lumbar Roll   PTRx Ther Act 1 - Details re-instructed in correct sitting posture use of lumbar roll avoid RH and rounded shoulders interrupt sitting   Skilled Intervention Education and exercise to decrease pain and increase function   Plan   Updates to plan of care assess effect of c-retr and MAT   Plan for next session impingement testing, L ankle inv MMT,   Total Session Time   Timed Code Treatment Minutes 40   Total Treatment Time (sum of timed and untimed services) 40

## 2025-07-09 ENCOUNTER — THERAPY VISIT (OUTPATIENT)
Dept: PHYSICAL THERAPY | Facility: CLINIC | Age: 40
End: 2025-07-09
Payer: COMMERCIAL

## 2025-07-09 DIAGNOSIS — G89.29 CHRONIC RIGHT SHOULDER PAIN: Primary | ICD-10-CM

## 2025-07-09 DIAGNOSIS — M79.605 BILATERAL LEG PAIN: ICD-10-CM

## 2025-07-09 DIAGNOSIS — M79.604 BILATERAL LEG PAIN: ICD-10-CM

## 2025-07-09 DIAGNOSIS — M25.511 CHRONIC RIGHT SHOULDER PAIN: Primary | ICD-10-CM

## 2025-07-09 PROCEDURE — 97110 THERAPEUTIC EXERCISES: CPT | Mod: GP | Performed by: PHYSICAL THERAPIST

## 2025-07-09 PROCEDURE — 97530 THERAPEUTIC ACTIVITIES: CPT | Mod: GP | Performed by: PHYSICAL THERAPIST

## 2025-07-09 NOTE — PROGRESS NOTES
No change to shin splints. Have tried REIL, MAT, posture correction, lower leg/foot and ankle strengthening, proximal/core strengthening, and stretching. She may return to MD for further options.    The right shoulder is pain-free for daily functions but she has not tried returning to strengthening--the pain usually starts after 1-2 weeks of strengthening. Added cuff strengthening with no weights today and will slowly build up strength/stamina. Plan to see her in 1 month after strengthening to re-assess and progress strengthening.     PLAN  Continue therapy per current plan of care.    Beginning/End Dates of Progress Note Reporting Period:  06/16/25 to 07/09/2025    Referring Provider:  Carolyn Preston       07/09/25 0500   Appointment Info   Signing clinician's name / credentials Seda Bullock PT   Total/Authorized Visits Changed plan to 14   Visits Used 10   Medical Diagnosis Chronic R shldr pain and B medial tibial stress syndrome   PT Tx Diagnosis R shldr pain and weakness and bilateral shin pain and weakness   Progress Note/Certification   Onset of illness/injury or Date of Surgery   (MD order date)   Therapy Frequency 2x/month   Predicted Duration 8 weeks   Progress Note Due Date 08/14/25   Progress Note Completed Date 06/16/25   GOALS   PT Goals 2   PT Goal 1   Goal Identifier Lifting   Goal Description Patient will be able to lift 8# overhead without pain   Rationale to maximize safety and independence with performance of ADLs and functional tasks;to maximize safety and independence within the home  (and strength program)   Goal Progress Has not been able to progress past the weight of her cell phone with strengthening exercises.   Target Date 07/28/25   PT Goal 2   Goal Identifier Ambulation   Goal Description Patient will be able to walk 45' without pain   Rationale to maximize safety and independence with performance of ADLs and functional tasks;to maximize safety and independence within the  "community   Goal Progress Can walk 30 minutes every other day with 2/10 pain in B shins. Pain increases to 5/10 with walking >30 minutes.   Target Date 07/28/25   Subjective Report   Subjective Report No change to shin pain. Gets 2/10 pain with wakling 30 minutes and 5/10 pain walking more than 30 minutes. Tried MAT but had to stop d/t increased LBP. Has been doing chin tucks regularly and has no shoulder pain with any daily activities, but states if she were to go back to upper body strengthening, the shoulder likely witll get sore and then ADL's would be painful.   Objective Measures   Objective Measures Objective Measure 3;Objective Measure 4   Objective Measure 1   Objective Measure Shoulder AROM and MMT   Details WNL bilaterally; 5/5 bilaterally   Objective Measure 2   Objective Measure CAROM: flex=100%, ext=66%, RR=90% (R neck pain), HW=250%, HEG=545%   Details LAROM flexion, extension and left SG WNL, right SG min loss with \"tightness\"   Objective Measure 3   Objective Measure B ankle AROM: wnl   Details B ankle MMT DF and PF=5/5   Objective Measure 4   Objective Measure posture   Details FH, increased angle at CT junction, rounded shoulders   Treatment Interventions (PT)   Interventions Therapeutic Procedure/Exercise;Therapeutic Activity   Therapeutic Procedure/Exercise   Therapeutic Procedures: strength, endurance, ROM, flexibility minutes (09760) 35   PTRx Ther Proc 1 Cervical Retraction with Overpressure   PTRx Ther Proc 1 - Details x10 5-6x/day   PTRx Ther Proc 2 Shoulder Scaption Full Can   PTRx Ther Proc 2 - Details 2 sets of 15 every other day --fatigues quickly cues for posture and scap retr/derp   PTRx Ther Proc 3 Shoulder Extension in Standing   PTRx Ther Proc 3 - Details 2 sets of 15 every other day--cues for scap retr/depr   PTRx Ther Proc 4 Shoulder Horizontal Abduction Standing   PTRx Ther Proc 4 - Details 2 sets of 15 every other day--cues for scap retr/depr   PTRx Ther Proc 5 Shoulder " External Rotation Sidelying   PTRx Ther Proc 5 - Details 2 sets of 15 every other day--cues for scap   PTRx Ther Proc 6 Prone Press Ups   PTRx Ther Proc 6 - Details pt tried for 1 week with no change to shin spints also gets R shoulder pain; d/c   PTRx Ther Proc 7 Standing Extension   PTRx Ther Proc 7 - Details d/c d/t increased back pain and no change to shin pain   PTRx Ther Proc 8 Theraband Ankle Dorsiflexion   PTRx Ther Proc 8 - Details Cont qod up to 2x15 avoid sxs   PTRx Ther Proc 9 Theraband Ankle Inversion   PTRx Ther Proc 9 - Details cont qod up to 2x15 avoid sxs    PTRx Ther Proc 10 Foot Doming   PTRx Ther Proc 10 - Details cont HEP 2x/day 10-15 reps    PTRx Ther Proc 11 Clamshell Feet Apart   PTRx Ther Proc 11 - Details HEP - cont    PTRx Ther Proc 12 Knee Bends   PTRx Ther Proc 12 - Details  cont HEP qod    PTRx Ther Proc 13 Side Stepping With Theraband   PTRx Ther Proc 13 - Details  cont HEP qod   PTRx Ther Proc 14 Hip AROM Standing Abduction   PTRx Ther Proc 14 - Details  cont HEP qod 2x15 each side    PTRx Ther Proc 15 Donkey Kick   PTRx Ther Proc 15 - Details  cont HEP qod    Skilled Intervention Instruction in exercise to increase ROM and strength to decrease pain and increase function   Therapeutic Activity   Therapeutic Activities: dynamic activities to improve functional performance minutes (19917) 8   Ther Act 1 rationale for HEP, testing local vs referred symptoms, answered pt questions   PTRx Ther Act 1 Posture Correction with Lumbar Roll   PTRx Ther Act 1 - Details reviewed proper sitting posture in vehicle firm chairs and sofa use of lumbar roll avoid FH/rounded shoulders   Skilled Intervention Education and exercise to decrease pain and increase function   Patient Response/Progress pt demonstrates understanding   Plan   Updates to plan of care 1 month for cuff strengthening, see pt   Plan for next session push up plus, wall ball   Total Session Time   Timed Code Treatment Minutes 43   Total  Treatment Time (sum of timed and untimed services) 43

## 2025-08-06 ENCOUNTER — THERAPY VISIT (OUTPATIENT)
Dept: PHYSICAL THERAPY | Facility: CLINIC | Age: 40
End: 2025-08-06
Payer: COMMERCIAL

## 2025-08-06 DIAGNOSIS — M79.605 BILATERAL LEG PAIN: ICD-10-CM

## 2025-08-06 DIAGNOSIS — M79.604 BILATERAL LEG PAIN: ICD-10-CM

## 2025-08-06 DIAGNOSIS — M25.511 CHRONIC RIGHT SHOULDER PAIN: Primary | ICD-10-CM

## 2025-08-06 DIAGNOSIS — G89.29 CHRONIC RIGHT SHOULDER PAIN: Primary | ICD-10-CM

## 2025-08-06 PROCEDURE — 97110 THERAPEUTIC EXERCISES: CPT | Mod: GP | Performed by: PHYSICAL THERAPIST

## 2025-08-06 PROCEDURE — 97530 THERAPEUTIC ACTIVITIES: CPT | Mod: GP | Performed by: PHYSICAL THERAPIST

## 2025-08-29 ENCOUNTER — APPOINTMENT (OUTPATIENT)
Dept: CT IMAGING | Facility: CLINIC | Age: 40
End: 2025-08-29
Attending: EMERGENCY MEDICINE
Payer: COMMERCIAL

## 2025-08-29 ENCOUNTER — HOSPITAL ENCOUNTER (EMERGENCY)
Facility: CLINIC | Age: 40
Discharge: HOME | End: 2025-08-29
Attending: EMERGENCY MEDICINE | Admitting: EMERGENCY MEDICINE
Payer: COMMERCIAL

## 2025-08-29 VITALS
WEIGHT: 270 LBS | OXYGEN SATURATION: 98 % | DIASTOLIC BLOOD PRESSURE: 99 MMHG | HEIGHT: 66 IN | BODY MASS INDEX: 43.39 KG/M2 | HEART RATE: 77 BPM | TEMPERATURE: 97.4 F | RESPIRATION RATE: 18 BRPM | SYSTOLIC BLOOD PRESSURE: 140 MMHG

## 2025-08-29 DIAGNOSIS — R10.31 RLQ ABDOMINAL PAIN: Primary | ICD-10-CM

## 2025-08-29 LAB
ALBUMIN SERPL BCG-MCNC: 4.2 G/DL (ref 3.5–5.2)
ALP SERPL-CCNC: 74 U/L (ref 40–150)
ALT SERPL W P-5'-P-CCNC: 21 U/L (ref 0–50)
ANION GAP SERPL CALCULATED.3IONS-SCNC: 10 MMOL/L (ref 7–15)
AST SERPL W P-5'-P-CCNC: 18 U/L (ref 0–45)
BASOPHILS # BLD AUTO: <0.03 10E3/UL (ref 0–0.2)
BASOPHILS NFR BLD AUTO: 0.1 %
BILIRUB SERPL-MCNC: 0.2 MG/DL
BUN SERPL-MCNC: 16.9 MG/DL (ref 6–20)
CALCIUM SERPL-MCNC: 9.5 MG/DL (ref 8.8–10.4)
CHLORIDE SERPL-SCNC: 104 MMOL/L (ref 98–107)
CREAT SERPL-MCNC: 0.89 MG/DL (ref 0.51–0.95)
CRP SERPL-MCNC: 6.93 MG/L
EGFRCR SERPLBLD CKD-EPI 2021: 85 ML/MIN/1.73M2
EOSINOPHIL # BLD AUTO: 0.05 10E3/UL (ref 0–0.7)
EOSINOPHIL NFR BLD AUTO: 0.6 %
ERYTHROCYTE [DISTWIDTH] IN BLOOD BY AUTOMATED COUNT: 12 % (ref 10–15)
GLUCOSE SERPL-MCNC: 118 MG/DL (ref 70–99)
HCG INTACT+B SERPL-ACNC: <1 MIU/ML
HCO3 SERPL-SCNC: 25 MMOL/L (ref 22–29)
HCT VFR BLD AUTO: 43.4 % (ref 35–47)
HGB BLD-MCNC: 14.6 G/DL (ref 11.7–15.7)
IMM GRANULOCYTES # BLD: 0.03 10E3/UL
IMM GRANULOCYTES NFR BLD: 0.4 %
LYMPHOCYTES # BLD AUTO: 1.82 10E3/UL (ref 0.8–5.3)
LYMPHOCYTES NFR BLD AUTO: 22.4 %
MCH RBC QN AUTO: 30.9 PG (ref 26.5–33)
MCHC RBC AUTO-ENTMCNC: 33.6 G/DL (ref 31.5–36.5)
MCV RBC AUTO: 91.8 FL (ref 78–100)
MONOCYTES # BLD AUTO: 0.82 10E3/UL (ref 0–1.3)
MONOCYTES NFR BLD AUTO: 10.1 %
NEUTROPHILS # BLD AUTO: 5.4 10E3/UL (ref 1.6–8.3)
NEUTROPHILS NFR BLD AUTO: 66.4 %
NRBC # BLD AUTO: <0.03 10E3/UL
NRBC BLD AUTO-RTO: 0 /100
PLATELET # BLD AUTO: 229 10E3/UL (ref 150–450)
POTASSIUM SERPL-SCNC: 4.1 MMOL/L (ref 3.4–5.3)
PROT SERPL-MCNC: 7 G/DL (ref 6.4–8.3)
RBC # BLD AUTO: 4.73 10E6/UL (ref 3.8–5.2)
SODIUM SERPL-SCNC: 139 MMOL/L (ref 135–145)
WBC # BLD AUTO: 8.13 10E3/UL (ref 4–11)

## 2025-08-29 PROCEDURE — 36415 COLL VENOUS BLD VENIPUNCTURE: CPT | Performed by: EMERGENCY MEDICINE

## 2025-08-29 PROCEDURE — 84702 CHORIONIC GONADOTROPIN TEST: CPT | Performed by: EMERGENCY MEDICINE

## 2025-08-29 PROCEDURE — 80053 COMPREHEN METABOLIC PANEL: CPT | Performed by: EMERGENCY MEDICINE

## 2025-08-29 PROCEDURE — 82565 ASSAY OF CREATININE: CPT | Performed by: EMERGENCY MEDICINE

## 2025-08-29 PROCEDURE — 99285 EMERGENCY DEPT VISIT HI MDM: CPT | Mod: 25 | Performed by: EMERGENCY MEDICINE

## 2025-08-29 PROCEDURE — 85025 COMPLETE CBC W/AUTO DIFF WBC: CPT | Performed by: EMERGENCY MEDICINE

## 2025-08-29 PROCEDURE — 86140 C-REACTIVE PROTEIN: CPT | Performed by: EMERGENCY MEDICINE

## 2025-08-29 PROCEDURE — 250N000009 HC RX 250: Performed by: EMERGENCY MEDICINE

## 2025-08-29 PROCEDURE — 74177 CT ABD & PELVIS W/CONTRAST: CPT

## 2025-08-29 PROCEDURE — 255N000002 HC RX 255 OP 636: Performed by: EMERGENCY MEDICINE

## 2025-08-29 RX ADMIN — IOHEXOL 135 ML: 350 INJECTION, SOLUTION INTRAVENOUS at 19:52

## 2025-08-29 RX ADMIN — SODIUM CHLORIDE 79 ML: 9 INJECTION, SOLUTION INTRAVENOUS at 19:52

## 2025-08-29 ASSESSMENT — ACTIVITIES OF DAILY LIVING (ADL)
ADLS_ACUITY_SCORE: 41

## 2025-08-29 ASSESSMENT — COLUMBIA-SUICIDE SEVERITY RATING SCALE - C-SSRS
1. IN THE PAST MONTH, HAVE YOU WISHED YOU WERE DEAD OR WISHED YOU COULD GO TO SLEEP AND NOT WAKE UP?: NO
6. HAVE YOU EVER DONE ANYTHING, STARTED TO DO ANYTHING, OR PREPARED TO DO ANYTHING TO END YOUR LIFE?: NO
2. HAVE YOU ACTUALLY HAD ANY THOUGHTS OF KILLING YOURSELF IN THE PAST MONTH?: NO

## 2025-09-03 ENCOUNTER — THERAPY VISIT (OUTPATIENT)
Dept: PHYSICAL THERAPY | Facility: CLINIC | Age: 40
End: 2025-09-03
Payer: COMMERCIAL

## 2025-09-03 DIAGNOSIS — M79.605 BILATERAL LEG PAIN: ICD-10-CM

## 2025-09-03 DIAGNOSIS — M25.511 CHRONIC RIGHT SHOULDER PAIN: Primary | ICD-10-CM

## 2025-09-03 DIAGNOSIS — M79.604 BILATERAL LEG PAIN: ICD-10-CM

## 2025-09-03 DIAGNOSIS — G89.29 CHRONIC RIGHT SHOULDER PAIN: Primary | ICD-10-CM

## 2025-09-03 PROCEDURE — 97110 THERAPEUTIC EXERCISES: CPT | Mod: GP | Performed by: PHYSICAL THERAPIST

## 2025-09-03 PROCEDURE — 97530 THERAPEUTIC ACTIVITIES: CPT | Mod: GP | Performed by: PHYSICAL THERAPIST
